# Patient Record
Sex: FEMALE | Race: WHITE | NOT HISPANIC OR LATINO
[De-identification: names, ages, dates, MRNs, and addresses within clinical notes are randomized per-mention and may not be internally consistent; named-entity substitution may affect disease eponyms.]

---

## 2017-05-11 ENCOUNTER — APPOINTMENT (OUTPATIENT)
Dept: ORTHOPEDIC SURGERY | Facility: CLINIC | Age: 67
End: 2017-05-11

## 2017-05-11 DIAGNOSIS — Z78.9 OTHER SPECIFIED HEALTH STATUS: ICD-10-CM

## 2017-05-11 RX ORDER — CHROMIUM 200 MCG
1000 TABLET ORAL
Refills: 0 | Status: ACTIVE | COMMUNITY

## 2017-05-11 RX ORDER — ASPIRIN 81 MG
81 TABLET, DELAYED RELEASE (ENTERIC COATED) ORAL
Refills: 0 | Status: ACTIVE | COMMUNITY

## 2017-07-05 ENCOUNTER — APPOINTMENT (OUTPATIENT)
Dept: ORTHOPEDIC SURGERY | Facility: CLINIC | Age: 67
End: 2017-07-05

## 2017-07-06 ENCOUNTER — APPOINTMENT (OUTPATIENT)
Dept: ORTHOPEDIC SURGERY | Facility: CLINIC | Age: 67
End: 2017-07-06

## 2017-10-06 ENCOUNTER — APPOINTMENT (OUTPATIENT)
Dept: ORTHOPEDIC SURGERY | Facility: CLINIC | Age: 67
End: 2017-10-06
Payer: MEDICARE

## 2017-10-06 PROCEDURE — 99214 OFFICE O/P EST MOD 30 MIN: CPT

## 2017-10-06 PROCEDURE — 73560 X-RAY EXAM OF KNEE 1 OR 2: CPT | Mod: RT

## 2017-10-06 PROCEDURE — 73564 X-RAY EXAM KNEE 4 OR MORE: CPT | Mod: LT

## 2017-10-06 PROCEDURE — 73030 X-RAY EXAM OF SHOULDER: CPT | Mod: RT

## 2018-01-10 ENCOUNTER — APPOINTMENT (OUTPATIENT)
Dept: ORTHOPEDIC SURGERY | Facility: CLINIC | Age: 68
End: 2018-01-10
Payer: MEDICARE

## 2018-01-10 PROCEDURE — 73564 X-RAY EXAM KNEE 4 OR MORE: CPT | Mod: LT

## 2018-01-10 PROCEDURE — 99214 OFFICE O/P EST MOD 30 MIN: CPT | Mod: 25

## 2018-01-10 PROCEDURE — 20610 DRAIN/INJ JOINT/BURSA W/O US: CPT | Mod: LT

## 2018-01-10 RX ORDER — ESOMEPRAZOLE MAGNESIUM 20 MG/1
20 CAPSULE, DELAYED RELEASE ORAL
Qty: 180 | Refills: 0 | Status: ACTIVE | COMMUNITY
Start: 2017-10-26

## 2018-01-10 RX ORDER — PROPRANOLOL HYDROCHLORIDE 40 MG/1
40 TABLET ORAL
Qty: 360 | Refills: 0 | Status: ACTIVE | COMMUNITY
Start: 2017-06-29

## 2018-01-17 ENCOUNTER — OTHER (OUTPATIENT)
Age: 68
End: 2018-01-17

## 2018-05-09 ENCOUNTER — APPOINTMENT (OUTPATIENT)
Dept: ORTHOPEDIC SURGERY | Facility: CLINIC | Age: 68
End: 2018-05-09
Payer: MEDICARE

## 2018-05-09 DIAGNOSIS — M94.262 CHONDROMALACIA, LEFT KNEE: ICD-10-CM

## 2018-05-09 PROCEDURE — 20610 DRAIN/INJ JOINT/BURSA W/O US: CPT | Mod: LT

## 2018-05-09 PROCEDURE — 99213 OFFICE O/P EST LOW 20 MIN: CPT | Mod: 25

## 2018-06-07 ENCOUNTER — APPOINTMENT (OUTPATIENT)
Dept: ORTHOPEDIC SURGERY | Facility: CLINIC | Age: 68
End: 2018-06-07

## 2018-06-07 ENCOUNTER — APPOINTMENT (OUTPATIENT)
Dept: ORTHOPEDIC SURGERY | Facility: CLINIC | Age: 68
End: 2018-06-07
Payer: MEDICARE

## 2018-06-07 PROCEDURE — 99214 OFFICE O/P EST MOD 30 MIN: CPT

## 2018-06-12 ENCOUNTER — APPOINTMENT (OUTPATIENT)
Dept: ORTHOPEDIC SURGERY | Facility: CLINIC | Age: 68
End: 2018-06-12

## 2018-09-04 ENCOUNTER — APPOINTMENT (OUTPATIENT)
Dept: ORTHOPEDIC SURGERY | Facility: CLINIC | Age: 68
End: 2018-09-04
Payer: MEDICARE

## 2018-09-04 PROCEDURE — 99214 OFFICE O/P EST MOD 30 MIN: CPT

## 2018-09-04 PROCEDURE — 73564 X-RAY EXAM KNEE 4 OR MORE: CPT | Mod: LT

## 2018-09-04 RX ORDER — CIPROFLOXACIN HYDROCHLORIDE 500 MG/1
500 TABLET, FILM COATED ORAL
Qty: 14 | Refills: 0 | Status: COMPLETED | COMMUNITY
Start: 2018-06-25

## 2018-09-04 RX ORDER — CELECOXIB 200 MG/1
200 CAPSULE ORAL
Qty: 60 | Refills: 0 | Status: ACTIVE | COMMUNITY
Start: 2018-05-10

## 2018-09-04 RX ORDER — CLINDAMYCIN PHOSPHATE 20 MG/G
2 CREAM VAGINAL
Qty: 40 | Refills: 0 | Status: COMPLETED | COMMUNITY
Start: 2018-05-24

## 2018-09-04 RX ORDER — NITROFURANTOIN (MONOHYDRATE/MACROCRYSTALS) 25; 75 MG/1; MG/1
100 CAPSULE ORAL
Qty: 14 | Refills: 0 | Status: COMPLETED | COMMUNITY
Start: 2018-07-03

## 2018-09-04 NOTE — ASSESSMENT
[FreeTextEntry1] : Riya comes in because she had acute pain today in her knee. It had been doing really well over the summer. It seems like the Synvisc injection may have been helpful.\par She should take some anti-inflammatories for a couple days to quiet down the acute pain. She needs to be careful with her stomach.\par Heat and ice. She's going to get into some physical therapy again for the knee and we reviewed the home exercises. She has a knee brace as well which I would like for her to try using.\par His pain is ongoing we might try another injection either of steroids were in another couple months we could do another round of the hyaluronic acid injections.\par The arthritis doesn't seem to be progressing and ultimately knee replacement may be an option. Hopefully it will settle down since it had been doing rather well.\par She also has had some intermittent shoulder pain. It is doing better now since she can swim for 3 weeks. If the pain starts up again she will go for an MRI given that this has been an ongoing issue. She can also do some physical therapy again for the shoulder and the NSAIDs may be helpful.\par She gets an MRI will call her with the results.

## 2018-09-04 NOTE — HISTORY OF PRESENT ILLNESS
[de-identified] : Riya comes in for followup Acutely for her left knee. This morning she will cup and it was acutely painful and she had difficulty bearing any weight. She was last seen about 3 months ago. We did a corticosteroid injection 4 months ago and the knee was significantly better all summer.\par She just got back from [place]. She had been doing a lot of walking and was okay. She had fallen when she was in [place] and had an abrasion to the knee but it really felt okay after that. Yesterday she went to put him ago gardens and was walking. She will cup this morning with pain. She did go swimming and then after swimming she had worse pain and difficulty walking but the knee had already been hurting. She has not taken anything today. Pain is in the anterior proximal shin just below the knee joint. No swelling or locking but it is stiff. As she was moving around in the office her knee seemed to be getting better than it was when she called up for the appointment\par \par Her Right shoulder has also been hurting. I had seen her for this last October. It was doing better but started to hurt again. She had an appointment this Friday for the shoulder.Her shoulder has been feeling better in the last 3 weeks since she made the appointment because she hasn't been swimming at all. Before that it had been hurting. It usually will hurt posterior lateral and lateral

## 2018-09-04 NOTE — PHYSICAL EXAM
[de-identified] : Left Knee:\par Mildly antalgic gait.\par No effusion.\par - erythema, edema, warmth.\par Healing abrasion over the left anterior knee from a fall last week\par Tender Medial joint line and on the tibia just below the joint line medially. No lateral tenderness.Nontender on the tibial crest.\par ROM:  -4 degrees extension to 115-120 degrees flexion.  Pain at extremes of motion. Minimal crepitus\par - Marina.\par 1A Lachman.  - Pivot shift. - posterior drawer. Normal rotational, varus/valgus laxity.\par Intact extensor mechanism.\par NVI distally.\par \par Right shoulder:\par Cervical spine is without tenderness and ROM is within normal limits and without pain.\par Normal appearance. \par AROM: 175 FE, IR to T 9 equal to the left, 180 abd.\par PROM: 180 FE, 70 ER at the side, 90 ER and 60 IR in the 90 degree abducted position.\par Motor:  5/5  supraspinatus,  5/5 ER, 5/5 IR, 5/5 biceps, 5/5 deltoid.  Normal lift off test\par - Neer, mildly positive Day. -  X-arm.   - Richardson's,  - Speeds.\par Tender over the biceps tendon and the anterior shoulder.  \par Laxity: normal.\par No scapular winging.\par Sensation is intact distally in the UE.\par Skin is intact in the UE. \par Intact Motor distally.\par  [de-identified] : X-rays weightbearing 4 views left knee today show Moderate medial compartment narrowing with spurs. Mild patella spurs. No fractures or bony lesions.

## 2018-09-07 ENCOUNTER — APPOINTMENT (OUTPATIENT)
Dept: ORTHOPEDIC SURGERY | Facility: CLINIC | Age: 68
End: 2018-09-07
Payer: MEDICARE

## 2018-10-29 ENCOUNTER — APPOINTMENT (OUTPATIENT)
Dept: ORTHOPEDIC SURGERY | Facility: CLINIC | Age: 68
End: 2018-10-29
Payer: MEDICARE

## 2018-10-29 VITALS
OXYGEN SATURATION: 98 % | HEART RATE: 71 BPM | WEIGHT: 140 LBS | SYSTOLIC BLOOD PRESSURE: 132 MMHG | HEIGHT: 62 IN | DIASTOLIC BLOOD PRESSURE: 89 MMHG | BODY MASS INDEX: 25.76 KG/M2

## 2018-10-29 DIAGNOSIS — M75.111 INCOMPLETE ROTATOR CUFF TEAR OR RUPTURE OF RIGHT SHOULDER, NOT SPECIFIED AS TRAUMATIC: ICD-10-CM

## 2018-10-29 PROCEDURE — 99214 OFFICE O/P EST MOD 30 MIN: CPT

## 2018-10-29 NOTE — HISTORY OF PRESENT ILLNESS
[de-identified] : Riya comes in for followup for her LEFT knee pain with progressive medial compartment arthritis and meniscus tearing. Her knee is feeling okay. \par It was feeling work when she made the appointment.\par She stopped swimming and walking as much and rested.\par She was going to PT.\par They added back up to 2 miles /day \par She is leaving 4 weeks from this Thursday to go to [place].\par The shoulder is better too. She did therapy. She is not having as much pain since she stopped swimming.\par There is an occasional twinge of pain. The knee may hurt up to 3/10.

## 2018-10-29 NOTE — ASSESSMENT
[FreeTextEntry1] : a 68-year-old with LEFT knee moderate medial compartment degenerative arthritis and meniscus tear and  RIGHT shoulder small partial Articular rotator cuff tear.\par Both are feeling better but she has been much less active than she was before.\par I think she can slowly add inactivity whether it swimming or biking or walking but should stop if it aggravates the conditions.\par It's been almost 6 months since the Synvisc injection and we can do another hyaluronic acid injection after November 9. Depending on how her knee is feeling she will schedule that as needed.\par I emphasized the knee extension because she does have a small flexion contracture and she will continue to do the strengthening exercises. Ibuprofen or Tylenol as needed.\par Followup as needed

## 2018-10-29 NOTE — PHYSICAL EXAM
[UE/LE] : Sensory: Intact in bilateral upper & lower extremities [Normal RUE] : Right Upper Extremity: No scars, rashes, lesions, ulcers, skin intact [Normal LUE] : Left Upper Extremity: No scars, rashes, lesions, ulcers, skin intact [Normal RLE] : Right Lower Extremity: No scars, rashes, lesions, ulcers, skin intact [Normal LLE] : Left Lower Extremity: No scars, rashes, lesions, ulcers, skin intact [Normal Touch] : sensation intact for touch [Normal] : No swelling, no edema, normal pedal pulses and normal temperature [de-identified] : LEFT Knee:\par Nonantalgic gait.\par - effusion.\par - erythema, edema, warmth.\par Tender mildly medial joint line.\par ROM: -5 degrees extension to 130 degrees flexion. - crepitus\par - Marina.\par 1A Lachman.  - Pivot shift. - posterior drawer. Normal rotational, varus/valgus laxity.\par Intact extensor mechanism.\par NVI distally.\par \par RIGHT shoulder:\par Normal appearance. \par AROM: 180 FE, IR to T 9 with mild pain, 180 abd.\par PROM: 180 FE, 70 ER at the side, 90 ER and 50 IR in the 90 degree abducted position.\par Motor:  5/5  supraspinatus,  5/5 ER, 5/5 IR, 5/5 biceps, 5/5 deltoid.  Normal lift off test\par Mildly +Neer and  Day. -  X-arm.   - Urich's, mildly+ Speeds.\par Nontender a.c. joint and nontender over the biceps tendon and the anterior shoulder.  \par Sensation is intact distally in the UE.\par Skin is intact in the UE. \par Intact Motor distally.\par  [de-identified] : \par MRI of RIGHT shoulder September 13, 2018 showed a partial articular supraspinatus tear involving only about 20% thickness, a.c. joint arthrosis, biceps tendinosis and bursitis.

## 2018-10-29 NOTE — REASON FOR VISIT
[Follow-Up Visit] : a follow-up visit for [Knee Pain] : knee pain [Knee Osteoarthritis] : knee osteoarthritis [Shoulder Impingement] : shoulder impingement

## 2018-11-02 ENCOUNTER — APPOINTMENT (OUTPATIENT)
Dept: ORTHOPEDIC SURGERY | Facility: CLINIC | Age: 68
End: 2018-11-02

## 2018-11-07 NOTE — REASON FOR VISIT
[Follow-Up Visit] : a follow-up visit for [Knee Pain] : knee pain [Knee Osteoarthritis] : knee osteoarthritis

## 2018-11-09 ENCOUNTER — APPOINTMENT (OUTPATIENT)
Dept: ORTHOPEDIC SURGERY | Facility: CLINIC | Age: 68
End: 2018-11-09
Payer: MEDICARE

## 2018-11-09 PROCEDURE — 99211 OFF/OP EST MAY X REQ PHY/QHP: CPT | Mod: 25

## 2018-11-09 PROCEDURE — 20610 DRAIN/INJ JOINT/BURSA W/O US: CPT | Mod: LT

## 2018-11-09 NOTE — HISTORY OF PRESENT ILLNESS
[de-identified] : Riya comes in for her left knee pain/ OA. Her knee is not has terrible as it has been but she has been doing less activity did not aggravate it. She decided to try the hyaluronic acid injection.\par

## 2018-11-09 NOTE — PHYSICAL EXAM
[de-identified] : Left Knee:\par Nonantalgic gait.\par 1+  effusion.\par - erythema, edema, warmth.\par Tender medial joint line.\par ROM: -4 degrees extension to 125 degrees flexion. crepitus\par - Marina.\par 1A Lachman.  - Pivot shift. - posterior drawer. Normal rotational, varus/valgus laxity.\par Intact extensor mechanism.\par NVI distally.\par

## 2018-11-09 NOTE — PROCEDURE
[Aspiration] : Aspiration [Injection] : Injection [Knee Joint] : knee joint [Osteoarthritis] : Osteoarthritis [Joint Pain] : Joint pain [Patient] : patient [Risk] : risk [Benefits] : benefits [Alternatives] : alternatives [Bleeding] : bleeding [Infection] : infection [Allergic Reaction] : allergic reaction [Verbal Consent Obtained] : verbal consent was obtained prior to the procedure [Alcohol] : Alcohol [Betadine] : Betadine [Ethyl Chloride Spray] : ethyl chloride spray was used as a topical anesthetic [___ mL Fluid] : [unfilled] mL of [Yellow] : yellow [Clear] : clear [Same Needle/New Syringe] : the syringe was changed and the same needle was left in place and [Bandage Applied] : a bandage [Tolerated Well] : The patient tolerated the procedure well [None] : none [No Strenuous Activity___day(s)] : avoid strenuous activity for [unfilled] day(s) [PRN] : as needed [FreeTextEntry8] : Monovisc 88mg/4ml, [id number], 6/30/20

## 2018-11-09 NOTE — ASSESSMENT
[FreeTextEntry1] : 68-year-old with progressive LEFT knee osteoarthritis medial compartment. I think that it entered meniscus tears really not the issue at this point and it's really the arthritis. Today we did another hyaluronic acid injection. I did a different brand which has less risk of allergic reaction than the Synvisc which he had last time.\par Hopefully will give her good relief. She should continue doing her exercises. She'll take it easy a few days after the injection and can ice and take the Celebrex if needed.\par She will start swimming next month and hopefully won't aggravate her shoulder which is feeling better.\par Followup as needed

## 2019-07-09 ENCOUNTER — APPOINTMENT (OUTPATIENT)
Dept: ORTHOPEDIC SURGERY | Facility: CLINIC | Age: 69
End: 2019-07-09
Payer: MEDICARE

## 2019-07-09 PROCEDURE — 99214 OFFICE O/P EST MOD 30 MIN: CPT

## 2019-07-09 NOTE — PHYSICAL EXAM
[LE] : Sensory: Intact in bilateral lower extremities [DP] : dorsalis pedis 2+ and symmetric bilaterally [PT] : posterior tibial 2+ and symmetric bilaterally [Normal RUE] : Right Upper Extremity: No scars, rashes, lesions, ulcers, skin intact [Normal LUE] : Left Upper Extremity: No scars, rashes, lesions, ulcers, skin intact [Normal RLE] : Right Lower Extremity: No scars, rashes, lesions, ulcers, skin intact [Normal LLE] : Left Lower Extremity: No scars, rashes, lesions, ulcers, skin intact [Normal Touch] : sensation intact for touch [de-identified] : Knees:\par Nonantalgic gait.\par No effusion.\par - erythema, edema, warmth.\par No significant point tenderness anywhere bilateral knees..\par ROM: Mild flexion contracture RIGHT knee of about 5° with flexion to 125°. LEFT knee is 0-130°. No significant crepitus\par - Marina.\par 1A Lachman. Normal rotational, varus/valgus laxity.\par Intact extensor mechanism.\par NVI distally.\par \par RIGHT  shoulder:\par Cervical spine is without tenderness and ROM is within normal limits and without pain.\par Normal appearance. \par AROM: 175 FE, IR to T 9 versus T8, 180 abd.\par PROM: 175 FE, 50 ER at the side, 90 ER and 15  IR RIGHT versus 45 on the LEFT in the 90 degree abducted position.\par Motor:  5/5  supraspinatus,  5/5 ER, 5/5 IR, 5/5 biceps, 5/5 deltoid.  Normal lift off test\par mildly positive Neer and Dya RIGHT shoulder. -  X-arm.   - Fairview's. Mildly positive Speeds RIGHT shoulder.\par Nontender a.c. joint and nontender over the biceps tendon and the anterior shoulder.  \par No scapular winging.\par Sensation is intact distally in the UE.\par Skin is intact in the UE. \par Intact Motor distally.\par

## 2019-07-09 NOTE — ASSESSMENT
[FreeTextEntry1] : A 69-year-old with LEFT knee moderate osteoarthritis which seems to be doing significantly better now than it was a year ago. She still has some pain but she has cut back a bit on her exercise and strengthening then lost a little bit of weight and perhaps a hyaluronic acid injections helped somewhat. Given the fact that her knee is doing well neither press felt it was necessary to do another injection at this time but if the pain gets worse then certainly we can do another hyaluronic acid injection. If the pain gets worse we will also get new x-rays to see if the arthritis is worsening.\par Tylenol as needed for pain. Try to maintain motion and strength.\par Her shoulder is doing well with mild stiffness but good strength. She is swimming. I suggested trying things when she swims to see if this take stress off the shoulder at times. Maintain rotator cuff and periscapular strengthening.\par Followup as needed

## 2019-07-09 NOTE — HISTORY OF PRESENT ILLNESS
[de-identified] : Riya comes in for f/u for her left knee. \par She was last seen in November 2018 and we did a hyaluronic acid injection, Monovisc, which she found helpful.I took a while to take and that her knee then did seem to get better for an extended period of time. It hasn't been swelling.\par She last had x-rays in September 2018 a little over 10 months ago which showed moderate medial compartment narrowing/osteoarthritis.\par Her knee is doing fare. 1 mo ago with a lot of clicking.It was more painful as well and she called to make the appointment. In the meantime since then her knee has improved and it is not as bad.\par She takes no medication for pain. She cannot take any NSAIDs.\par She stopped swimming for awhile. She is swimming 1/2 hr\par She is walking , [id number] steps / day.\par Her RIGHT shoulder hurts a variable amount but is better than it used to be. She is able to swim. If she sleeps on it then it will hurt when she wakes up.\par She did lose 10 pounds.

## 2019-09-06 ENCOUNTER — APPOINTMENT (OUTPATIENT)
Dept: ORTHOPEDIC SURGERY | Facility: CLINIC | Age: 69
End: 2019-09-06
Payer: MEDICARE

## 2019-09-06 DIAGNOSIS — M25.462 EFFUSION, LEFT KNEE: ICD-10-CM

## 2019-09-06 PROCEDURE — 20611 DRAIN/INJ JOINT/BURSA W/US: CPT | Mod: LT

## 2019-09-06 PROCEDURE — 73564 X-RAY EXAM KNEE 4 OR MORE: CPT | Mod: 50

## 2019-09-06 PROCEDURE — 99213 OFFICE O/P EST LOW 20 MIN: CPT | Mod: 25

## 2019-09-06 NOTE — HISTORY OF PRESENT ILLNESS
[de-identified] : Left knee is hurting more again in the past week. It hurts as she steps. Today it feels better again.\par Last HA injection was in Nov 2018.\par She cannot take NSAIDs . She can take Tylenol.\par Pain is aggravated by Walking and she was getting sharp pains in the front of her knee. No swelling or locking or buckling.

## 2019-09-06 NOTE — DISCUSSION/SUMMARY
[de-identified] : 70 yo with left knee OA Moderate to severe focally in the medial compartment. There is no significant RIGHT knee arthritis but likely there is some areas of mild wear and tear/chondromalacia.\par She is doing better now than she had been a year or 2 ago. She does get intermittent pain. Today is a good day but she was having a lot more pain recently. We discussed trying the hyaluronic acid injections again and decided to do it LEFT knee only. After the injection she had some stiffness and soreness which should resolve in the next day with some ice and rest and Tylenol.\par \par Continue exercises to keep muscles strong and keep weight down.\par Heat and ice as needed.\par Tylenol prn.,\par \par F/U prn

## 2019-09-06 NOTE — REASON FOR VISIT
[Follow-Up Visit] : a follow-up visit for [Knee Osteoarthritis] : knee osteoarthritis [Knee Pain] : knee pain

## 2019-09-06 NOTE — PROCEDURE
[Injection] : Injection [Left] : of the left [Knee Joint] : knee joint [Osteoarthritis] : Osteoarthritis [Patient] : patient [Risk] : risk [Benefits] : benefits [Alternatives] : alternatives [Bleeding] : bleeding [Infection] : infection [Allergic Reaction] : allergic reaction [Verbal Consent Obtained] : verbal consent was obtained prior to the procedure [Alcohol] : Alcohol [Ethyl Chloride Spray] : ethyl chloride spray was used as a topical anesthetic [Betadine] : Betadine [Lateral] : lateral [Same Needle/New Syringe] : the syringe was changed and the same needle was left in place and [Bandage Applied] : a bandage [None] : none [No Strenuous Activity___day(s)] : avoid strenuous activity for [unfilled] day(s) [PRN] : as needed [FreeTextEntry8] : Monovisc 88mg/4ml, lot [id number] , exp 11/30/21 [de-identified] : soreness in the knee after

## 2019-09-06 NOTE — PHYSICAL EXAM
[Knee Motion Right] : full ROM [Knee Swelling Left] : swelling [Knee Tenderness On Palpation Left] : tenderness [Knee Motion Left Crepitus] : crepitus with ROM [Normal] : Gait: normal [LE] : Sensory: Intact in bilateral lower extremities [Normal RLE] : Right Lower Extremity: No scars, rashes, lesions, ulcers, skin intact [Normal LLE] : Left Lower Extremity: No scars, rashes, lesions, ulcers, skin intact [Normal Touch] : sensation intact for touch [Knee Swelling Right] : no swelling [Knee Tenderness On Palpation Right] : no tenderness [Knee Motion Right Crepitus] : no crepitus with ROM [Knee Stability / Maneuvers] : negative patellofemoral apprehension test [Knee Anterior Drawer Sign Right] : negative anterior drawer sign [Knee Posterior Drawer Sign Right] : negative posterior drawer sign [Knee Meniscal Integ Julio César's Displace Test Right Medial] : negative Julio César's medially [Knee Meniscal Integ Julio César's Displace Test Right Lateral] : negative Julio César's laterally [Knee Instability Laxity Right Anterior Cruciate Ligament] : negative pivot shift test [Knee Medial Instability Right] : no laxity on valgus stress [Knee Lateral Instability Right] : no laxity on varus stress [Knee Motion Left] : limited ROM [Patellofemoral Apprehension Test Left] : negative patellofemoral apprehension test [Knee Anterior Drawer Sign Left] : negative anterior drawer sign [Knee Posterior Drawer Sign Left] : negative posterior drawer sign [Knee Tender On Palp With Quadriceps Contracted (Shrug Sign)] : negative patellar grind [Knee Instability Laxity Left Anterior Cruciate Ligament] : negative pivot shift test [Knee Medial Instability Left] : no laxity on valgus stress [Knee Lateral Instability Left] : no  laxity on varus stress [de-identified] : Knees\par No effusion. No erythema.\par Range of motion is 0-125° on the LEFT versus 130-135 on the RIGHT with pain on the LEFT on full flexion mildly.\par Tender mildly medial joint line LEFT knee.\par These are both stable to exam.\par Normal neurovascular exam. [de-identified] : \par x-rays left knee WB 4 views today show Medial compartment narrowing Moderately severe with small osteophytes and sclerosis. Other compartments are relatively well maintained as is the RIGHT knee x-rays weightbearing for views without significant arthritis

## 2020-03-11 PROCEDURE — 88307 TISSUE EXAM BY PATHOLOGIST: CPT | Mod: 26

## 2020-03-17 ENCOUNTER — OUTPATIENT (OUTPATIENT)
Dept: OUTPATIENT SERVICES | Facility: HOSPITAL | Age: 70
LOS: 1 days | End: 2020-03-17
Payer: MEDICARE

## 2020-03-17 DIAGNOSIS — D23.4 OTHER BENIGN NEOPLASM OF SKIN OF SCALP AND NECK: ICD-10-CM

## 2020-03-17 PROCEDURE — 88307 TISSUE EXAM BY PATHOLOGIST: CPT

## 2020-03-18 LAB — SURGICAL PATHOLOGY STUDY: SIGNIFICANT CHANGE UP

## 2020-09-22 PROBLEM — M25.562 LEFT KNEE PAIN, UNSPECIFIED CHRONICITY: Status: ACTIVE | Noted: 2017-05-11

## 2020-09-23 ENCOUNTER — APPOINTMENT (OUTPATIENT)
Dept: ORTHOPEDIC SURGERY | Facility: CLINIC | Age: 70
End: 2020-09-23
Payer: MEDICARE

## 2020-09-23 DIAGNOSIS — M25.562 PAIN IN LEFT KNEE: ICD-10-CM

## 2020-09-23 PROCEDURE — 20610 DRAIN/INJ JOINT/BURSA W/O US: CPT | Mod: LT

## 2020-09-23 PROCEDURE — 99213 OFFICE O/P EST LOW 20 MIN: CPT | Mod: 25

## 2020-09-23 PROCEDURE — 73564 X-RAY EXAM KNEE 4 OR MORE: CPT | Mod: 50

## 2020-09-23 NOTE — HISTORY OF PRESENT ILLNESS
[de-identified] : Riya was last seen 1 yr ago. Monovisc injection was done which helped. \par Left knee is hurting gradually more. It hurts as she steps. She can't bend her knee as well. She hasn't had the really severe pain she had in the past. \par The knee swells some. Pain is about 5/10 and occasionally worse. She can walk 3-5 miles, 3-4 days / wk. \par The right knee is starting to hurt. \par She cannot take NSAIDs . She can take Tylenol and sometimes uses Voltaren gel.\par Pain is aggravated by Walking and she was getting sharp pains in the front of her knee. No swelling or locking or buckling.
(4) no impairment

## 2020-09-23 NOTE — PROCEDURE
[Aspiration] : Aspiration [Injection] : Injection [Left] : of the left [Knee Joint] : knee joint [Effusion] : Effusion [Osteoarthritis] : Osteoarthritis [Patient] : patient [Risk] : risk [Benefits] : benefits [Alternatives] : alternatives [Bleeding] : bleeding [Infection] : infection [Allergic Reaction] : allergic reaction [Verbal Consent Obtained] : verbal consent was obtained prior to the procedure [Alcohol] : Alcohol [Betadine] : Betadine [Ethyl Chloride Spray] : ethyl chloride spray was used as a topical anesthetic [Lateral] : lateral [20] : a 20-gauge [Yellow] : yellow [Clear] : clear [Same Needle/New Syringe] : the syringe was changed and the same needle was left in place and [Bandage Applied] : a bandage [Tolerated Well] : The patient tolerated the procedure well [None] : none [No Strenuous Activity___day(s)] : avoid strenuous activity for [unfilled] day(s) [PRN] : as needed [___ mL Fluid] : [unfilled] mL of [FreeTextEntry8] : Monovisc, 4 ml, lot [id number], exp 9/30/22

## 2020-09-23 NOTE — ASSESSMENT
[FreeTextEntry1] : A 70 year-old with LEFT knee moderate osteoarthritis and minimal RIGHT knee arthritis.\par We did hyaluronic acid injection today which was very helpful it seems year ago. The arthritis doesn't seem to be progressing significantly by x-ray that her symptoms are a little bit worse.\par She should get back to doing her knee exercises with strengthening around the knees. Gentle stretching. Heat and ice as needed.\par Tylenol and or Voltaren gel as needed for pain. Followup as needed

## 2020-09-23 NOTE — PHYSICAL EXAM
[LE] : Sensory: Intact in bilateral lower extremities [Knee Motion Right] : full ROM [Knee Swelling Left] : swelling [Knee Tenderness On Palpation Left] : tenderness [Knee Motion Left Crepitus] : crepitus with ROM [Normal RLE] : Right Lower Extremity: No scars, rashes, lesions, ulcers, skin intact [Normal LLE] : Left Lower Extremity: No scars, rashes, lesions, ulcers, skin intact [Normal Touch] : sensation intact for touch [DP] : dorsalis pedis 2+ and symmetric bilaterally [PT] : posterior tibial 2+ and symmetric bilaterally [Knee Swelling Right] : no swelling [Knee Tenderness On Palpation Right] : no tenderness [Knee Motion Right Crepitus] : no crepitus with ROM [Knee Stability / Maneuvers] : negative patellofemoral apprehension test [Knee Anterior Drawer Sign Right] : negative anterior drawer sign [Knee Posterior Drawer Sign Right] : negative posterior drawer sign [Knee Meniscal Integ Julio César's Displace Test Right Medial] : negative Julio César's medially [Knee Meniscal Integ Julio César's Displace Test Right Lateral] : negative Julio César's laterally [Knee Instability Laxity Right Anterior Cruciate Ligament] : negative pivot shift test [Knee Medial Instability Right] : no laxity on valgus stress [Knee Lateral Instability Right] : no laxity on varus stress [Knee Motion Left] : limited ROM [Patellofemoral Apprehension Test Left] : negative patellofemoral apprehension test [Knee Anterior Drawer Sign Left] : negative anterior drawer sign [Knee Posterior Drawer Sign Left] : negative posterior drawer sign [Knee Tender On Palp With Quadriceps Contracted (Shrug Sign)] : negative patellar grind [Knee Instability Laxity Left Anterior Cruciate Ligament] : negative pivot shift test [Knee Medial Instability Left] : no laxity on valgus stress [Knee Lateral Instability Left] : no  laxity on varus stress [Normal] : Oriented to person, place, and time, insight and judgement were intact and the affect was normal [de-identified] : Knees\par nonantalgic gait.\par Trace LEFT knee effusion. No erythema.\par Range of motion is 0-135° on the RIGHT versus 5° to 125°with pain on the LEFT on full flexion mildly.\par Tender mildly medial joint line LEFT knee. Minimally tender RIGHT knee\par Normal varus and valgus and AP stability..\par Normal neurovascular exam. [de-identified] : no respiratory distress or cough [de-identified] : \par x-rays bilateral knees WB 4 views today show Medial compartment narrowing Moderate LEFT and minimal RIGHT with small osteophytes and sclerosis. Other compartments are relatively well maintained as is the RIGHT knee x-rays weightbearing for views without significant arthritis

## 2020-10-19 ENCOUNTER — APPOINTMENT (OUTPATIENT)
Dept: ORTHOPEDIC SURGERY | Facility: CLINIC | Age: 70
End: 2020-10-19
Payer: MEDICARE

## 2020-10-19 PROCEDURE — 99214 OFFICE O/P EST MOD 30 MIN: CPT | Mod: 25

## 2020-10-19 PROCEDURE — 20610 DRAIN/INJ JOINT/BURSA W/O US: CPT | Mod: RT

## 2020-10-19 NOTE — ADDENDUM
[FreeTextEntry1] : Patient when immediately for an MRI scan of her RIGHT knee which showed degenerative complex tear posterior horn of the medial meniscus with a partial radial compound and and it appears to partly extend towards the meniscus root and there is some mild bone marrow edema in this area. There is some chondral wear central medial femoral condyle and the distal quadriceps tendinosis and a large joint effusion and synovitis.I recommended trying corticosteroid injection they'll keep her on crutches to keep pressure off the knee and use a knee brace. A medial  brace may be helpful to keep the stress off as well as healing. I prescribed a brace as well.\par ice and elevate. Tylenol, Voltaren gel or Celebrex as needed. She should do nonweightbearing exercises/straight leg raises to strengthen around her knee. Followup in about 2 weeks.

## 2020-10-19 NOTE — PROCEDURE
[Aspiration] : Aspiration [Injection] : Injection [Right] : of the right [Knee Joint] : knee joint [Joint Pain] : Joint pain [Patient] : patient [Risk] : risk [Benefits] : benefits [Bleeding] : bleeding [Alternatives] : alternatives [Infection] : infection [Allergic Reaction] : allergic reaction [Alcohol] : Alcohol [Verbal Consent Obtained] : verbal consent was obtained prior to the procedure [Betadine] : Betadine [Ethyl Chloride Spray] : ethyl chloride spray was used as a topical anesthetic [Lateral] : lateral [20] : a 20-gauge [Same Needle/New Syringe] : the syringe was changed and the same needle was left in place and [1% Lidocaine___(mL)] : [unfilled] mL of 1% Lidocaine [Methylpred. 40mg/mL___(mL)] : [unfilled] mL of 40mg/mL methylprednisolone [Bandage Applied] : a bandage [Tolerated Well] : The patient tolerated the procedure well [None] : none [No Strenuous Activity___day(s)] : avoid strenuous activity for [unfilled] day(s) [___ Week(s)] : in [unfilled] week(s) [___ mL Fluid] : [unfilled] mL of [de-identified] : serosanguinous [de-identified] : she could almost fully extend her knee after the injection and could flex further to about 90° with much less pain and put partial weight on her foot with less but not complete pain relief.

## 2020-10-19 NOTE — ASSESSMENT
[FreeTextEntry1] : 70-year-old woman with severe RIGHT knee pain. She has moderate LEFT knee medial compartment arthritis but just very minimal degenerative changes in her RIGHT knee based on x-ray a few weeks ago.\par She was referred for an MRI scan.\par Differential diagnosis includes meniscus tear versus insufficiency fracture I will call her with the results. If there is a degenerative meniscus tear we may try a corticosteroid injection first before considering arthroscopic surgery.\par if there is a large displaced flap then surgery may be considered first.\par If there is an insufficiency fracture than protected weightbearing.\par Continue with Tylenol elevate and ice and crutches.\par In her LEFT knee she has moderate arthritis which is now getting aggravated by favoring her RIGHT knee.\par \par

## 2020-10-19 NOTE — HISTORY OF PRESENT ILLNESS
[de-identified] : Ms. Yanez called over the weekend because she Had acute pain in her RIGHT knee. Normally ask her LEFT knee that gives her trouble. She first had pain in his knee last week and it would get better and worse depending on activity. It became quite severe with loss of flexion and extension and swelling. She had to get crutches to walk. There was no specific injury but she had been doing some gardening and had been walking regularly and a little further before this started.\par She took Tylenol and took some Celebrex over the weekend. Pain is severe, 9/10 worse with weightbearing and somewhat better with rest and sitting\par Last month when I saw her she was having some mild RIGHT knee pain at that time and we did do x-rays showing some mild arthritis.\par The Monovisc Injection in her LEFT knee about 3-4 weeks ago has not helped at all.

## 2020-10-19 NOTE — PHYSICAL EXAM
[Knee Swelling Right] : no swelling [Knee Motion Right Crepitus] : no crepitus with ROM [Knee Stability / Maneuvers] : negative patellofemoral apprehension test [Knee Tenderness On Palpation Right] : no tenderness [Knee Anterior Drawer Sign Right] : negative anterior drawer sign [Knee Posterior Drawer Sign Right] : negative posterior drawer sign [Knee Meniscal Integ Julio César's Displace Test Right Lateral] : negative Julio César's laterally [Knee Meniscal Integ Julio César's Displace Test Right Medial] : negative Julio César's medially [Knee Instability Laxity Right Anterior Cruciate Ligament] : negative pivot shift test [Knee Medial Instability Right] : no laxity on valgus stress [Knee Motion Left] : limited ROM [Knee Lateral Instability Right] : no laxity on varus stress [Knee Posterior Drawer Sign Left] : negative posterior drawer sign [Knee Anterior Drawer Sign Left] : negative anterior drawer sign [Patellofemoral Apprehension Test Left] : negative patellofemoral apprehension test [Knee Tender On Palp With Quadriceps Contracted (Shrug Sign)] : negative patellar grind [Knee Instability Laxity Left Anterior Cruciate Ligament] : negative pivot shift test [Knee Medial Instability Left] : no laxity on valgus stress [Knee Lateral Instability Left] : no  laxity on varus stress [de-identified] : Knees\par antalgic gait. She cannot put full weight on her RIGHT leg\par 1+ RIGHT knee effusion without significant warmth. No erythema or ecchymoses.\par Trace LEFT knee effusion. No erythema.\par Range of motion is -15-65° on the RIGHT versus 5° to 125°with pain on the LEFT on full flexion mildly.\par Tender mildly medial joint line LEFT knee\par No significant point tenderness RIGHT knee. She feels pain laterally but there is no tenderness lateral joint line or lateral femoral condyle\par + Marina right knee\par Normal varus and valgus and AP stability..\par Normal neurovascular exam. [de-identified] : no respiratory distress or cough [de-identified] : \par x-rays bilateral knees WB 4 views Sept 2020 showed Medial compartment narrowing Moderate LEFT and minimal RIGHT with small osteophytes and sclerosis. Other compartments are relatively well maintained as is the RIGHT knee x-rays weightbearing for views without significant arthritis

## 2020-11-02 ENCOUNTER — APPOINTMENT (OUTPATIENT)
Dept: ORTHOPEDIC SURGERY | Facility: CLINIC | Age: 70
End: 2020-11-02
Payer: MEDICARE

## 2020-11-02 PROCEDURE — 99214 OFFICE O/P EST MOD 30 MIN: CPT

## 2020-11-02 NOTE — ASSESSMENT
[FreeTextEntry1] : 70-year-old woman with acute RIGHT knee pain. She has moderate LEFT knee medial compartment arthritis but only mild degenerative changes in her RIGHT knee based on x-ray a few weeks ago. \par She was referred for an MRI scan.\par MRI showed a horizontal and small radial tear posterior horn medial meniscus without any significant displaced flaps that are seen. There may be a partial tear extending into the meniscal root without any obvious subluxation of the meniscus or extrusion.\par There is mild chondral wear medial and patellofemoral compartments.\par Her knee is much better today than it was a couple weeks ago.With a tear like this that is more degenerative associated with mild arthritis I would recommend trying some conservative treatment before and even thinking about doing a knee arthroscopy in surgery. These tears can get better without surgery. She now has much better motion and is able to walk on the leg which she couldn't do a couple weeks ago.\par She can start physical therapy next week but continue doing the leg lifts on her own for both knees. She can walk partial to full weightbearing with the crutches and using the knee braces as needed. If her knee is feeling a lot better she could use a cane.\par voltaren gel and Celebrex as needed. Ice has needed.\par Followup in 3-4 weeks

## 2020-11-02 NOTE — HISTORY OF PRESENT ILLNESS
[de-identified] : Ms. Yanez comes in for f/u for the  acute pain in her RIGHT knee. \par She felt good for 5-6 days after the injection but then twisted the knee slightly and pain got worse again last Sunday. It is feeling progressively better again and less painful. She's been using the crutches partial weightbearing for the RIGHT knee and also to try to not aggravate the arthritis on the LEFT.\par She has the  brace that she has for both her for her knees.\par Pain right now is worse on the LEFT than her RIGHT knee. She is understandably frustrated and would like to feel better so she can get back to walking. When I last saw her she was doing a lot of walking and her LEFT knee was not chronically painful to the point that she would think about knee replacement.\par She is taking some intermittent Celebrex.

## 2020-11-02 NOTE — PHYSICAL EXAM
[Crutches] : ambulates with crutches [LE] : Sensory: Intact in bilateral lower extremities [DP] : dorsalis pedis 2+ and symmetric bilaterally [PT] : posterior tibial 2+ and symmetric bilaterally [Knee Motion Right] : full ROM [Knee Swelling Left] : swelling [Knee Tenderness On Palpation Left] : tenderness [Knee Motion Left Crepitus] : crepitus with ROM [Normal RLE] : Right Lower Extremity: No scars, rashes, lesions, ulcers, skin intact [Normal LLE] : Left Lower Extremity: No scars, rashes, lesions, ulcers, skin intact [Normal Touch] : sensation intact for touch [Normal] : Oriented to person, place, and time, insight and judgement were intact and the affect was normal [Slightly Antalgic] : slightly antalgic [Knee Swelling Right] : no swelling [Knee Tenderness On Palpation Right] : no tenderness [Knee Motion Right Crepitus] : no crepitus with ROM [Knee Stability / Maneuvers] : negative patellofemoral apprehension test [Knee Anterior Drawer Sign Right] : negative anterior drawer sign [Knee Posterior Drawer Sign Right] : negative posterior drawer sign [Knee Meniscal Integ Julio César's Displace Test Right Medial] : negative Julio César's medially [Knee Meniscal Integ Julio César's Displace Test Right Lateral] : negative Julio César's laterally [Knee Instability Laxity Right Anterior Cruciate Ligament] : negative pivot shift test [Knee Medial Instability Right] : no laxity on valgus stress [Knee Lateral Instability Right] : no laxity on varus stress [Knee Motion Left] : limited ROM [Patellofemoral Apprehension Test Left] : negative patellofemoral apprehension test [Knee Anterior Drawer Sign Left] : negative anterior drawer sign [Knee Posterior Drawer Sign Left] : negative posterior drawer sign [Knee Tender On Palp With Quadriceps Contracted (Shrug Sign)] : negative patellar grind [Knee Instability Laxity Left Anterior Cruciate Ligament] : negative pivot shift test [Knee Medial Instability Left] : no laxity on valgus stress [Knee Lateral Instability Left] : no  laxity on varus stress [de-identified] : Knees\par She can walk without any brace or crutches short distance with mild LEFT greater than RIGHT knee pain. She is walking somewhat cautiously.. \par 1+ RIGHT knee effusion without significant warmth. No erythema or ecchymoses.\par Trace LEFT knee effusion. No erythema.\par Range of motion is 0 -125° on the RIGHT versus 5° to 125°with pain on the LEFT. more pain LEFT and RIGHT knee on full flexion\par Tender mildly medial joint line LEFT knee. Tender RIGHT knee medial joint line mildly.\par With gentle Marina she did not have pain. I did not want to aggravate her knee so did not twist it with any force\par Normal varus and valgus and AP stability..\par Normal neurovascular exam. [de-identified] : no respiratory distress or cough [de-identified] : \par x-rays bilateral knees WB 4 views Sept 2020 showed Medial compartment narrowing Moderate LEFT and minimal RIGHT with small osteophytes and sclerosis. Other compartments are relatively well maintained as is the RIGHT knee x-rays weightbearing for views without significant arthritis \par \par MRI right knee on 10/19/20 showed a complex tear near meniscal root and posterior horn and mild cartilage wear PF and medial compartments.

## 2020-11-11 ENCOUNTER — RESULT REVIEW (OUTPATIENT)
Age: 70
End: 2020-11-11

## 2020-11-11 PROCEDURE — 88305 TISSUE EXAM BY PATHOLOGIST: CPT | Mod: 26

## 2020-11-12 DIAGNOSIS — M79.661 PAIN IN RIGHT LOWER LEG: ICD-10-CM

## 2020-11-13 ENCOUNTER — OUTPATIENT (OUTPATIENT)
Dept: OUTPATIENT SERVICES | Facility: HOSPITAL | Age: 70
LOS: 1 days | End: 2020-11-13
Payer: MEDICARE

## 2020-11-13 DIAGNOSIS — L72.3 SEBACEOUS CYST: ICD-10-CM

## 2020-11-13 PROCEDURE — 88305 TISSUE EXAM BY PATHOLOGIST: CPT

## 2020-11-17 LAB — SURGICAL PATHOLOGY STUDY: SIGNIFICANT CHANGE UP

## 2020-11-24 ENCOUNTER — APPOINTMENT (OUTPATIENT)
Dept: ORTHOPEDIC SURGERY | Facility: CLINIC | Age: 70
End: 2020-11-24

## 2020-12-02 ENCOUNTER — APPOINTMENT (OUTPATIENT)
Dept: ORTHOPEDIC SURGERY | Facility: CLINIC | Age: 70
End: 2020-12-02
Payer: MEDICARE

## 2020-12-02 PROCEDURE — 99214 OFFICE O/P EST MOD 30 MIN: CPT

## 2020-12-02 NOTE — HISTORY OF PRESENT ILLNESS
[de-identified] : Ms. Yanez comes in for f/u for the  acute pain in her RIGHT knee with a posterior horn MMT. She injured it about 6 weeks ago. Her pain has been variable. She's not having any of the acute, severe pain like she had in the beginning. It seemed a lot better but then seemed to be getting worse. She can walk further distances. She is doing physical therapy.She is frustrated that she can't swim or do her normal exercises in part because of the Covid pandemic.\par She does get a little swelling.She did take some Celebrex.\par She was having leg pain/calf pain and was sent for Doppler US which was negative for DVT. I think it was likely the brace that was causing pressure on the nerve/probably the peroneal nerve.She stopped wearing the brace and the pain gradually went away.\par She has a 2-week-old grandchild and she is going to try to see in about 2 weeks she is going to be quarantining before that.

## 2020-12-02 NOTE — ASSESSMENT
[FreeTextEntry1] : 70-year-old woman with acute RIGHT knee pain. She has moderate LEFT knee medial compartment arthritis but only mild degenerative changes in her RIGHT knee. She had an acute RIGHT knee medial meniscus tear about 6 weeks ago. Her knee is improving but not quite enough as we would like. I recommended that she walk with a cane as needed. She could try the brace but should never leave it on when she is sitting or leave it on for long periods of time. Otherwise she can just wear her soft brace but nothing tight around her leg. He didn't ice as needed. Celebrex as needed. Physical therapy and home exercises.\par If it's still not getting better we could consider trying hyaluronic acid injection versus arthroscopic surgery. \par

## 2020-12-02 NOTE — PHYSICAL EXAM
[Knee Swelling Right] : no swelling [Knee Tenderness On Palpation Right] : no tenderness [Knee Motion Right Crepitus] : no crepitus with ROM [Knee Stability / Maneuvers] : negative patellofemoral apprehension test [Knee Anterior Drawer Sign Right] : negative anterior drawer sign [Knee Posterior Drawer Sign Right] : negative posterior drawer sign [Knee Meniscal Integ Julio César's Displace Test Right Medial] : negative Julio César's medially [Knee Meniscal Integ Julio César's Displace Test Right Lateral] : negative Julio César's laterally [Knee Instability Laxity Right Anterior Cruciate Ligament] : negative pivot shift test [Knee Medial Instability Right] : no laxity on valgus stress [Knee Lateral Instability Right] : no laxity on varus stress [Knee Motion Left] : limited ROM [Patellofemoral Apprehension Test Left] : negative patellofemoral apprehension test [Knee Anterior Drawer Sign Left] : negative anterior drawer sign [Knee Posterior Drawer Sign Left] : negative posterior drawer sign [Knee Tender On Palp With Quadriceps Contracted (Shrug Sign)] : negative patellar grind [Knee Instability Laxity Left Anterior Cruciate Ligament] : negative pivot shift test [Knee Medial Instability Left] : no laxity on valgus stress [Knee Lateral Instability Left] : no  laxity on varus stress [de-identified] : Knees\par Lynn she was walking with a limp keeping her RIGHT knee flexed but I encouraged her to try to walk more up RIGHT and she could do so but had some mild 2/10 pain in the RIGHT knee but no limp.. \par Trace + RIGHT knee effusion without significant warmth. No erythema or ecchymoses.\par Trace LEFT knee effusion. No erythema.\par Range of motion is 0 -125° on the RIGHT versus 5° to 125°with pain on the LEFT.  mild pain RIGHT knee on full flexion\par Tender mildly medial joint line LEFT knee. Tender RIGHT knee medial joint line mildly.\par With gentle Marina she did not have pain.\par Normal varus and valgus and AP stability..\par Normal neurovascular exam. [de-identified] : no respiratory distress or cough [de-identified] : \par x-rays bilateral knees WB 4 views Sept 2020 showed Medial compartment narrowing Moderate LEFT and minimal RIGHT with small osteophytes and sclerosis. Other compartments are relatively well maintained as is the RIGHT knee x-rays weightbearing for views without significant arthritis \par \par MRI right knee on 10/19/20 showed a complex tear near meniscal root and posterior horn and mild cartilage wear PF and medial compartments.

## 2021-03-23 PROBLEM — M67.911 TENDINOPATHY OF ROTATOR CUFF, RIGHT: Status: ACTIVE | Noted: 2017-10-06

## 2021-03-26 ENCOUNTER — APPOINTMENT (OUTPATIENT)
Dept: ORTHOPEDIC SURGERY | Facility: CLINIC | Age: 71
End: 2021-03-26
Payer: MEDICARE

## 2021-03-26 DIAGNOSIS — M25.461 EFFUSION, RIGHT KNEE: ICD-10-CM

## 2021-03-26 DIAGNOSIS — M67.911 UNSPECIFIED DISORDER OF SYNOVIUM AND TENDON, RIGHT SHOULDER: ICD-10-CM

## 2021-03-26 DIAGNOSIS — M19.011 PRIMARY OSTEOARTHRITIS, RIGHT SHOULDER: ICD-10-CM

## 2021-03-26 PROCEDURE — 20610 DRAIN/INJ JOINT/BURSA W/O US: CPT | Mod: RT

## 2021-03-26 PROCEDURE — 99214 OFFICE O/P EST MOD 30 MIN: CPT | Mod: 25

## 2021-03-26 PROCEDURE — 73030 X-RAY EXAM OF SHOULDER: CPT | Mod: RT

## 2021-03-26 NOTE — PROCEDURE
[Aspiration] : Aspiration [Injection] : Injection [Right] : of the right [Knee Joint] : knee joint [Effusion] : Effusion [Osteoarthritis] : Osteoarthritis [Patient] : patient [Risk] : risk [Benefits] : benefits [Alternatives] : alternatives [Bleeding] : bleeding [Infection] : infection [Allergic Reaction] : allergic reaction [Verbal Consent Obtained] : verbal consent was obtained prior to the procedure [Same Needle/New Syringe] : the syringe was changed and the same needle was left in place and [Bandage Applied] : a bandage [Tolerated Well] : The patient tolerated the procedure well [None] : none [Ethyl Chloride Spray] : ethyl chloride spray was used as a topical anesthetic [Lateral] : lateral [20] : a 20-gauge [___ mL Fluid] : [unfilled] mL of [Yellow] : yellow [Clear] : clear [1% Lidocaine___(mL)] : [unfilled] mL of 1% Lidocaine [Methylpred. 40mg/mL___(mL)] : [unfilled] mL of 40mg/mL methylprednisolone [No Strenuous Activity___day(s)] : avoid strenuous activity for [unfilled] day(s)

## 2021-03-26 NOTE — PHYSICAL EXAM
[Slightly Antalgic] : slightly antalgic [Crutches] : ambulates with crutches [DP] : dorsalis pedis 2+ and symmetric bilaterally [PT] : posterior tibial 2+ and symmetric bilaterally [Knee Motion Right] : full ROM [Knee Swelling Left] : swelling [Knee Tenderness On Palpation Left] : tenderness [Knee Motion Left Crepitus] : crepitus with ROM [Normal RUE] : Right Upper Extremity: No scars, rashes, lesions, ulcers, skin intact [Normal LUE] : Left Upper Extremity: No scars, rashes, lesions, ulcers, skin intact [Normal RLE] : Right Lower Extremity: No scars, rashes, lesions, ulcers, skin intact [Normal LLE] : Left Lower Extremity: No scars, rashes, lesions, ulcers, skin intact [Normal Touch] : sensation intact for touch [Normal] : Oriented to person, place, and time, insight and judgement were intact and the affect was normal [UE/LE] : Sensory: Intact in bilateral upper & lower extremities [Knee Swelling Right] : no swelling [Knee Tenderness On Palpation Right] : no tenderness [Knee Motion Right Crepitus] : no crepitus with ROM [Knee Stability / Maneuvers] : negative patellofemoral apprehension test [Knee Anterior Drawer Sign Right] : negative anterior drawer sign [Knee Posterior Drawer Sign Right] : negative posterior drawer sign [Knee Meniscal Integ Julio César's Displace Test Right Medial] : negative Julio César's medially [Knee Meniscal Integ Julio César's Displace Test Right Lateral] : negative Julio César's laterally [Knee Instability Laxity Right Anterior Cruciate Ligament] : negative pivot shift test [Knee Medial Instability Right] : no laxity on valgus stress [Knee Lateral Instability Right] : no laxity on varus stress [Knee Motion Left] : limited ROM [Patellofemoral Apprehension Test Left] : negative patellofemoral apprehension test [Knee Anterior Drawer Sign Left] : negative anterior drawer sign [Knee Posterior Drawer Sign Left] : negative posterior drawer sign [Knee Tender On Palp With Quadriceps Contracted (Shrug Sign)] : negative patellar grind [Knee Instability Laxity Left Anterior Cruciate Ligament] : negative pivot shift test [Knee Medial Instability Left] : no laxity on valgus stress [Knee Lateral Instability Left] : no  laxity on varus stress [de-identified] : Knees\par Nonantalgic gait.\par 1 + RIGHT knee effusion without significant warmth. No erythema or ecchymoses.\par - LEFT knee effusion. No erythema.\par Range of motion is 5 -125° on the RIGHT versus 5° to 125°with pain on the LEFT.  +pain RIGHT knee on full flexion\par Tender mildly medial joint line LEFT knee. Tender RIGHT knee medial joint line\par - Marina.\par Normal varus and valgus and AP stability..\par Normal neurovascular exam.\par \par Right shoulder\par No edema, ecchymosis, erythema.\par Range of motion is mildly limited in all directions on the right shoulder compared to the left.  There is 175 degrees forward elevation with pain on forward elevation, internal rotation to L3 versus T9.\par External rotation with the arm at the side is to about 50 degrees on the right versus 65 degrees on the left.\par Positive Neer and Day in the right shoulder.\par Motor is good with 5/5 supraspinatus, internal rotation, external rotation and biceps. [de-identified] : no respiratory distress or cough [de-identified] : \par x-rays bilateral knees WB 4 views Sept 2020 showed Medial compartment narrowing Moderate LEFT and minimal RIGHT with small osteophytes and sclerosis. Other compartments are relatively well maintained as is the RIGHT knee x-rays weightbearing for views without significant arthritis \par \par MRI right knee on 10/19/20 showed a complex tear near meniscal root and posterior horn and mild cartilage wear PF and medial compartments.\par \par x-rays of the right shoulder 3 views today shows small inferior humeral head osteophyte with mild narrowing glenohumeral joint which can can be consistent with mild glenohumeral osteoarthritis.  Mild AC joint arthrosis and acromial spur.

## 2021-03-26 NOTE — ASSESSMENT
[FreeTextEntry1] : 70-year-old woman with left knee medial compartment arthritis, right knee mild arthritis and had a complex degenerative medial meniscus tear about 8 months ago doing much better but still has some pain and swelling recently.  Today an aspiration and cortisone injection was done in the right knee which hopefully will help.  If pain is ongoing we may try the hyaluronic acid injections next time.  Continue with physical therapy and strengthening exercises.  Heat and ice as needed.  Voltaren gel as needed.  She can take very limited oral nonsteroidal anti-inflammatories.\par For the shoulder she has some mild glenohumeral arthritis which may explain some of the stiffness.  She likely has some rotator cuff tendinopathy as well.  Given the global loss of motion initially I questioned if she could have early stages of an adhesive capsulitis but I think that is less likely than the arthritis.  We will see how this evolves over time to make a better diagnosis.  If she is having ongoing pain she can go for diagnostic ultrasound and cortisone injection can be done at the same time.  Otherwise she should follow-up in about 6 to 8 weeks so I can check and see how it is doing and why she is feeling slightly better.\par She will be very careful with lifting the baby and other activities that aggravate the shoulder but we want to keep the shoulder moving to prevent further stiffness.\par We may consider an MRI or other work-up or injection as needed.

## 2021-03-26 NOTE — HISTORY OF PRESENT ILLNESS
[de-identified] : Ms. Yanez comes in for f/u for RIGHT knee with a posterior horn MMT. She injured it about 5 mos ago. Her pain has been variable.  Recently it is not nearly as bad as it had been when I saw her several months ago.  She is walking more but still has pain that can be mild to moderate and is more intermittent.\par It can feel tight.\par She does not take any anti-inflammatories because she does not tolerate them well.\par She has been trying to exercise although recently backed off because of the pain.  \par \par The right shoulder Started hurting 3 to 4 weeks ago without any specific injury but she does have a new grandchild and has been lifting her the baby more.  She has pain lifting the arm and reaching behind her.  Putting on her bra can be painful.  Pain was worse about a week ago.  Reaching behind her back is very painful.   she has had some mild prior shoulder pain but was not this bad in the past.  She had been given some shoulder exercises to do but stopped doing them because the shoulder was hurting more.

## 2021-10-29 PROBLEM — S83.231A COMPLEX TEAR OF MEDIAL MENISCUS OF RIGHT KNEE AS CURRENT INJURY, INITIAL ENCOUNTER: Status: ACTIVE | Noted: 2020-10-19

## 2021-11-01 ENCOUNTER — APPOINTMENT (OUTPATIENT)
Dept: ORTHOPEDIC SURGERY | Facility: CLINIC | Age: 71
End: 2021-11-01
Payer: MEDICARE

## 2021-11-01 DIAGNOSIS — S83.231A COMPLEX TEAR OF MEDIAL MENISCUS, CURRENT INJURY, RIGHT KNEE, INITIAL ENCOUNTER: ICD-10-CM

## 2021-11-01 DIAGNOSIS — S83.242D OTHER TEAR OF MEDIAL MENISCUS, CURRENT INJURY, LEFT KNEE, SUBSEQUENT ENCOUNTER: ICD-10-CM

## 2021-11-01 PROCEDURE — 20610 DRAIN/INJ JOINT/BURSA W/O US: CPT | Mod: 50

## 2021-11-01 PROCEDURE — 99213 OFFICE O/P EST LOW 20 MIN: CPT | Mod: 25

## 2021-11-01 PROCEDURE — 73564 X-RAY EXAM KNEE 4 OR MORE: CPT | Mod: 50

## 2021-11-01 NOTE — HISTORY OF PRESENT ILLNESS
[de-identified] : Ms. Yanez comes in for f/u for bilateral knee pain worse on the right than left that is gotten progressively worse over the years.  Last year we had done steroid injections and hyaluronic acid injections.  She last had a steroid injection in March 2021 in the right knee.  She does try to exercise regularly.  Pain is quite variable.  She will take occasional Celebrex when the pain is bad but she needs to minimize Celebrex because of her stomach.  Tylenol does not seem to help much.  She does do some exercises to strengthen and rides a bike and elliptical and tries to stay active.

## 2021-11-01 NOTE — PHYSICAL EXAM
[Slightly Antalgic] : slightly antalgic [Crutches] : ambulates with crutches [UE/LE] : Sensory: Intact in bilateral upper & lower extremities [DP] : dorsalis pedis 2+ and symmetric bilaterally [PT] : posterior tibial 2+ and symmetric bilaterally [Knee Motion Right] : full ROM [Knee Swelling Left] : swelling [Knee Tenderness On Palpation Left] : tenderness [Knee Motion Left Crepitus] : crepitus with ROM [Normal RUE] : Right Upper Extremity: No scars, rashes, lesions, ulcers, skin intact [Normal LUE] : Left Upper Extremity: No scars, rashes, lesions, ulcers, skin intact [Normal RLE] : Right Lower Extremity: No scars, rashes, lesions, ulcers, skin intact [Normal LLE] : Left Lower Extremity: No scars, rashes, lesions, ulcers, skin intact [Normal Touch] : sensation intact for touch [Normal] : No swelling, no edema, normal pedal pulses and normal temperature [Knee Swelling Right] : no swelling [Knee Tenderness On Palpation Right] : no tenderness [Knee Motion Right Crepitus] : no crepitus with ROM [Knee Stability / Maneuvers] : negative patellofemoral apprehension test [Knee Anterior Drawer Sign Right] : negative anterior drawer sign [Knee Posterior Drawer Sign Right] : negative posterior drawer sign [Knee Meniscal Integ Julio César's Displace Test Right Medial] : negative Julio César's medially [Knee Meniscal Integ Julio César's Displace Test Right Lateral] : negative Julio César's laterally [Knee Instability Laxity Right Anterior Cruciate Ligament] : negative pivot shift test [Knee Medial Instability Right] : no laxity on valgus stress [Knee Lateral Instability Right] : no laxity on varus stress [Knee Motion Left] : limited ROM [Patellofemoral Apprehension Test Left] : negative patellofemoral apprehension test [Knee Anterior Drawer Sign Left] : negative anterior drawer sign [Knee Posterior Drawer Sign Left] : negative posterior drawer sign [Knee Tender On Palp With Quadriceps Contracted (Shrug Sign)] : negative patellar grind [Knee Instability Laxity Left Anterior Cruciate Ligament] : negative pivot shift test [Knee Medial Instability Left] : no laxity on valgus stress [Knee Lateral Instability Left] : no  laxity on varus stress [de-identified] : Knees\par Nonantalgic gait.\par -RIGHT knee effusion. No warmth. No erythema or ecchymoses.\par - LEFT knee effusion. No erythema.\par Range of motion is 5 -120° on the RIGHT versus 0° to 125°with pain on the LEFT.  +pain RIGHT knee on full flexion\par Tender mildly medial joint line LEFT knee. Tender RIGHT knee medial joint line\par - Marina.\par Normal varus and valgus and AP stability..\par Normal neurovascular exam.\par  [de-identified] : no respiratory distress or cough [de-identified] : \par x-rays bilateral knees WB 4 views Sept 2020 showed Medial compartment narrowing Moderate LEFT and minimal RIGHT with small osteophytes and sclerosis. Other compartments are relatively well maintained as is the RIGHT knee x-rays weightbearing for views without significant arthritis \par \par MRI right knee on 10/19/20 showed a complex tear near meniscal root and posterior horn and mild cartilage wear PF and medial compartments.\par \par x-rays of the right knee WB 4 views today showed moderate to severe medial joint space narrowing with osteophytes tricompartmental greatest medially right greater than left knee.  Also in the right knee there is evidence of a subchondral cyst in the medial femoral condyle

## 2021-11-01 NOTE — PROCEDURE
[Injection] : Injection [Bilateral] : of bilateral [Knee Joint] : knee joint [Osteoarthritis] : Osteoarthritis [Patient] : patient [Risk] : risk [Benefits] : benefits [Alternatives] : alternatives [Bleeding] : bleeding [Infection] : infection [Allergic Reaction] : allergic reaction [Verbal Consent Obtained] : verbal consent was obtained prior to the procedure [Alcohol] : Alcohol [Betadine] : Betadine [Ethyl Chloride Spray] : ethyl chloride spray was used as a topical anesthetic [___mL] : [unfilled] ~UmL of lidocaine [Lateral] : lateral [20] : a 20-gauge [Bandage Applied] : a bandage [Tolerated Well] : The patient tolerated the procedure well [None] : none [No Strenuous Activity___day(s)] : avoid strenuous activity for [unfilled] day(s) [PRN] : as needed [[facility]8] : [id number]

## 2021-11-01 NOTE — ASSESSMENT
[FreeTextEntry1] : 71-year-old woman with bilateral knee moderate to approaching severe medial compartment osteoarthritis.  Arthritis is not quite bone-on-bone.  There is tricompartmental changes.  In the right knee the arthritis has progressed significantly in the past year.\par We talked about nonoperative and operative treatment for knee arthritis.  We are going to try the hyaluronic acid injections which have given her some relief in the past and that was done today.  She should continue to do the strengthening exercises.  Warm soaks and ice as needed.  Tylenol or Celebrex as needed for the pain.\par She is still able to be relatively active although does experience a fair bit of pain.\par We talked about time to consider surgery.  I think we should try the injections first and see if these help before we consider surgery/knee replacement.\par She will follow-up as needed.

## 2022-01-14 ENCOUNTER — APPOINTMENT (OUTPATIENT)
Dept: ORTHOPEDIC SURGERY | Facility: CLINIC | Age: 72
End: 2022-01-14
Payer: MEDICARE

## 2022-01-14 DIAGNOSIS — M70.62 TROCHANTERIC BURSITIS, LEFT HIP: ICD-10-CM

## 2022-01-14 PROCEDURE — 99214 OFFICE O/P EST MOD 30 MIN: CPT

## 2022-01-14 RX ORDER — DICLOFENAC EPOLAMINE 0.01 G/1
1.3 SYSTEM TOPICAL TWICE DAILY
Qty: 60 | Refills: 1 | Status: ACTIVE | COMMUNITY
Start: 2022-01-14 | End: 1900-01-01

## 2022-01-14 NOTE — ASSESSMENT
[FreeTextEntry1] : 71-year-old with bilateral knee osteoarthritis and a flexion contracture on the right doing better since the hyaluronic acid injections now with about 4 weeks of lateral left hip pain most consistent with greater trochanteric bursitis.  I recommended avoiding any aggravating activity whether it be too much exercise or carrying the baby.  Heat and ice and NSAIDs as needed.  I prescribed Flector patch which would not be likely to aggravate her stomach like oral NSAIDs which she can only take on a very limited basis.  Physical therapy and home exercises were given.  Follow-up if its not getting better in the next month or so in which case we may get x-rays and consider an injection.\par The hyaluronic acid injections seem to help her knees which are doing better now.

## 2022-01-14 NOTE — PHYSICAL EXAM
[LE] : Sensory: Intact in bilateral lower extremities [Knee Swelling Right] : no swelling [Knee Tenderness On Palpation Right] : no tenderness [Knee Motion Right Crepitus] : no crepitus with ROM [Knee Stability / Maneuvers] : negative patellofemoral apprehension test [Knee Anterior Drawer Sign Right] : negative anterior drawer sign [Knee Posterior Drawer Sign Right] : negative posterior drawer sign [Knee Meniscal Integ Julio César's Displace Test Right Medial] : negative Julio César's medially [Knee Meniscal Integ Julio César's Displace Test Right Lateral] : negative Julio César's laterally [Knee Instability Laxity Right Anterior Cruciate Ligament] : negative pivot shift test [Knee Medial Instability Right] : no laxity on valgus stress [Knee Lateral Instability Right] : no laxity on varus stress [Knee Motion Left] : limited ROM [Patellofemoral Apprehension Test Left] : negative patellofemoral apprehension test [Knee Anterior Drawer Sign Left] : negative anterior drawer sign [Knee Posterior Drawer Sign Left] : negative posterior drawer sign [Knee Tender On Palp With Quadriceps Contracted (Shrug Sign)] : negative patellar grind [Knee Instability Laxity Left Anterior Cruciate Ligament] : negative pivot shift test [Knee Medial Instability Left] : no laxity on valgus stress [Knee Lateral Instability Left] : no  laxity on varus stress [de-identified] : Knees\par Nonantalgic gait.\par -RIGHT knee effusion. No warmth. No erythema or ecchymoses.\par - LEFT knee effusion. No erythema.\par Range of motion is 5 -120° on the RIGHT versus 0° to 125°with pain on the LEFT.  +pain RIGHT knee on full flexion\par Tender mildly medial joint line LEFT knee. Tender RIGHT knee medial joint line\par - Marina.\par Normal varus and valgus and AP stability..\par Normal neurovascular exam.\par \par Left hip\par Tender over the greater trochanter and distal gluteus medius.\par Intact straight leg raise and hip abduction.\par Hip joint range of motion is good without any groin pain.\par No lumbar tenderness or tenderness in the sciatic notch.\par Negative straight leg raise. [de-identified] : no respiratory distress or cough [de-identified] : \par x-rays of the right knee WB 4 views November 1, 2021 showed moderate to severe medial joint space narrowing with osteophytes tricompartmental greatest medially right greater than left knee.  In the right knee there was evidence of a subchondral cyst in the medial femoral condyle

## 2022-01-14 NOTE — HISTORY OF PRESENT ILLNESS
[de-identified] : Ms. Yanez comes in for with pain in her left lateral hip going on for about a month that is variable but tends to be worse walking.  It does not keep her up typically at night.  If she is pushing the baby stroller it tends to be better.  Her grandson is now about a year old or so and obviously getting bigger.  She can take occasional Celebrex but cannot take it regularly because of GI issues.\par Her knees are doing better since last visit 2-1/2 months ago Monovisc injections were done in both knees

## 2022-09-23 ENCOUNTER — APPOINTMENT (OUTPATIENT)
Dept: ORTHOPEDIC SURGERY | Facility: CLINIC | Age: 72
End: 2022-09-23

## 2022-09-23 PROCEDURE — 99213 OFFICE O/P EST LOW 20 MIN: CPT | Mod: 25

## 2022-09-23 PROCEDURE — 20610 DRAIN/INJ JOINT/BURSA W/O US: CPT | Mod: 59,RT

## 2022-09-23 NOTE — HISTORY OF PRESENT ILLNESS
[de-identified] : Ms. Yanez comes in for with pain primarily in her right greater than left knee related to the arthritis.  We last did hyaluronic acid injection in November 2021.  She has been swimming a lot and exercises and had done some physical therapy earlier in the year related to the left hip pain.\par No swelling or locking or buckling.  The pain in the knees has been achy and sore and variable.  The last few days has been pretty good but that had been bothering her more before that.  She is leaving in a few weeks to go on a vacation.\par She was thinking about having injection began in the right knee.\par She just takes Tylenol intermittently if needed but nothing on a regular basis

## 2022-09-23 NOTE — ASSESSMENT
[FreeTextEntry1] : 72-year-old with bilateral knee osteoarthritis and a flexion contracture on the right more severe on the right.  Hyaluronic acid injection was done today in the right knee only.\par It had been about 10 months since the last injection so she did get good relatively long-lasting relief.  She has lost weight which also likely helped and she is exercising.\par Hopefully will give her good long-lasting relief.  Next time she comes in we will get x-rays.\par Follow-up as needed

## 2022-09-23 NOTE — PHYSICAL EXAM
[Slightly Antalgic] : slightly antalgic [Crutches] : ambulates with crutches [LE] : Sensory: Intact in bilateral lower extremities [DP] : dorsalis pedis 2+ and symmetric bilaterally [PT] : posterior tibial 2+ and symmetric bilaterally [Knee Motion Right] : full ROM [Knee Swelling Left] : swelling [Knee Tenderness On Palpation Left] : tenderness [Knee Motion Left Crepitus] : crepitus with ROM [Normal RLE] : Right Lower Extremity: No scars, rashes, lesions, ulcers, skin intact [Normal LLE] : Left Lower Extremity: No scars, rashes, lesions, ulcers, skin intact [Normal Touch] : sensation intact for touch [Normal] : Oriented to person, place, and time, insight and judgement were intact and the affect was normal [Knee Swelling Right] : no swelling [Knee Tenderness On Palpation Right] : no tenderness [Knee Motion Right Crepitus] : no crepitus with ROM [Knee Stability / Maneuvers] : negative patellofemoral apprehension test [Knee Anterior Drawer Sign Right] : negative anterior drawer sign [Knee Posterior Drawer Sign Right] : negative posterior drawer sign [Knee Meniscal Integ Julio César's Displace Test Right Medial] : negative Julio César's medially [Knee Meniscal Integ Julio César's Displace Test Right Lateral] : negative Julio César's laterally [Knee Instability Laxity Right Anterior Cruciate Ligament] : negative pivot shift test [Knee Medial Instability Right] : no laxity on valgus stress [Knee Lateral Instability Right] : no laxity on varus stress [Knee Motion Left] : limited ROM [Patellofemoral Apprehension Test Left] : negative patellofemoral apprehension test [Knee Anterior Drawer Sign Left] : negative anterior drawer sign [Knee Posterior Drawer Sign Left] : negative posterior drawer sign [Knee Tender On Palp With Quadriceps Contracted (Shrug Sign)] : negative patellar grind [Knee Instability Laxity Left Anterior Cruciate Ligament] : negative pivot shift test [Knee Medial Instability Left] : no laxity on valgus stress [Knee Lateral Instability Left] : no  laxity on varus stress [de-identified] : Knees\par Slight limp\par -RIGHT knee effusion. No warmth. No erythema or ecchymoses.\par - LEFT knee effusion. No erythema.\par Range of motion is 5 -120° on the RIGHT versus 0° to 125°with pain on the LEFT.  +pain RIGHT knee on full flexion\par Tender mildly medial joint line LEFT knee. Tender RIGHT knee medial joint line\par - Marina.\par Normal varus and valgus and AP stability..\par Normal neurovascular exam.\par \par With hip and knee range of motion there is some pain in the left lateral hip and low back. [de-identified] : no respiratory distress or cough [de-identified] : \par x-rays of the right knee WB 4 views November 1, 2021 showed moderate to severe medial joint space narrowing with osteophytes tricompartmental greatest medially right greater than left knee.  In the right knee there was evidence of a subchondral cyst in the medial femoral condyle

## 2022-09-23 NOTE — PROCEDURE
Detail Level: Zone [Injection] : Injection [Right] : of the right [Knee Joint] : knee joint [Osteoarthritis] : Osteoarthritis [Patient] : patient [Risk] : risk [Benefits] : benefits [Alternatives] : alternatives [Bleeding] : bleeding [Infection] : infection [Allergic Reaction] : allergic reaction [Verbal Consent Obtained] : verbal consent was obtained prior to the procedure [Alcohol] : Alcohol [___mL] : [unfilled] ~UmL of lidocaine [Lateral] : lateral [20] : a 20-gauge [Bandage Applied] : a bandage [Tolerated Well] : The patient tolerated the procedure well [None] : none [No Strenuous Activity___day(s)] : avoid strenuous activity for [unfilled] day(s) [PRN] : as needed [FreeTextEntry1] : chloraprep [FreeTextEntry8] : Monovisc lot [id number], exp 4/30/24

## 2023-01-03 ENCOUNTER — APPOINTMENT (OUTPATIENT)
Dept: ORTHOPEDIC SURGERY | Facility: CLINIC | Age: 73
End: 2023-01-03
Payer: MEDICARE

## 2023-01-03 DIAGNOSIS — M94.261 CHONDROMALACIA, RIGHT KNEE: ICD-10-CM

## 2023-01-03 DIAGNOSIS — M25.561 PAIN IN RIGHT KNEE: ICD-10-CM

## 2023-01-03 PROCEDURE — 99213 OFFICE O/P EST LOW 20 MIN: CPT

## 2023-01-03 PROCEDURE — 73564 X-RAY EXAM KNEE 4 OR MORE: CPT | Mod: 50

## 2023-01-03 NOTE — PHYSICAL EXAM
[Slightly Antalgic] : slightly antalgic [Crutches] : ambulates with crutches [LE] : Sensory: Intact in bilateral lower extremities [DP] : dorsalis pedis 2+ and symmetric bilaterally [PT] : posterior tibial 2+ and symmetric bilaterally [Knee Motion Right] : full ROM [Knee Swelling Left] : swelling [Knee Tenderness On Palpation Left] : tenderness [Knee Motion Left Crepitus] : crepitus with ROM [Normal RLE] : Right Lower Extremity: No scars, rashes, lesions, ulcers, skin intact [Normal LLE] : Left Lower Extremity: No scars, rashes, lesions, ulcers, skin intact [Normal Touch] : sensation intact for touch [Normal] : Oriented to person, place, and time, insight and judgement were intact and the affect was normal [Knee Swelling Right] : no swelling [Knee Tenderness On Palpation Right] : no tenderness [Knee Motion Right Crepitus] : no crepitus with ROM [Knee Stability / Maneuvers] : negative patellofemoral apprehension test [Knee Anterior Drawer Sign Right] : negative anterior drawer sign [Knee Posterior Drawer Sign Right] : negative posterior drawer sign [Knee Meniscal Integ Julio César's Displace Test Right Medial] : negative Julio César's medially [Knee Meniscal Integ Julio César's Displace Test Right Lateral] : negative Julio César's laterally [Knee Instability Laxity Right Anterior Cruciate Ligament] : negative pivot shift test [Knee Medial Instability Right] : no laxity on valgus stress [Knee Lateral Instability Right] : no laxity on varus stress [Knee Motion Left] : limited ROM [Patellofemoral Apprehension Test Left] : negative patellofemoral apprehension test [Knee Anterior Drawer Sign Left] : negative anterior drawer sign [Knee Posterior Drawer Sign Left] : negative posterior drawer sign [Knee Tender On Palp With Quadriceps Contracted (Shrug Sign)] : negative patellar grind [Knee Instability Laxity Left Anterior Cruciate Ligament] : negative pivot shift test [Knee Medial Instability Left] : no laxity on valgus stress [Knee Lateral Instability Left] : no  laxity on varus stress [de-identified] : Knees\par She is walking on a mildly flexed right knee with a mild limp\par -RIGHT knee effusion. No warmth. No erythema or ecchymoses.\par - LEFT knee effusion. No erythema.\par Range of motion is 5 -120° on the RIGHT versus 0° to 125°with pain on the LEFT.  +pain RIGHT knee on full flexion\par Tender mildly medial joint line LEFT knee. Tender RIGHT knee medial joint line\par - Marina.\par Normal varus and valgus and AP stability..\par Normal neurovascular exam.\par \par With hip and knee range of motion there is some pain in the left lateral hip and low back. [de-identified] : no respiratory distress or cough [de-identified] : \par x-rays of the knees weightbearing 4 views today compared to right knee WB 4 views November 1, 2021 showed moderate to severe medial joint space narrowing with osteophytes tricompartmental greatest medially right greater than left knee.  There is some progression of the arthritis but still not bone-on-bone.  In the right knee there was evidence of a subchondral cyst in the medial femoral condyle.\par Mild patellofemoral arthritis.

## 2023-01-03 NOTE — HISTORY OF PRESENT ILLNESS
[de-identified] : Ms. Yanez comes in for with pain primarily in her right greater than left knee related to the arthritis.  We last did Monovisc hyaluronic acid injection in Sept 2022 which helped partially for a while but never fully better.. \par She developed severe right knee pain in the last week.  She was helping to take care of her grandchild and doing more activity.  There was no other specific injury that occurred.  Pain is more in the anterior knee.  No significant swelling or locking or buckling.\par She has been taking Celebrex twice a day but is not sure if it is 100 or 200 mg tablets.\par In the last 2 days she did a lot less activity and her knee does feel a good bit better today than it had been

## 2023-01-03 NOTE — ASSESSMENT
[FreeTextEntry1] : 72-year-old right knee osteoarthritis severe medial compartment with increased pain recently.  It may have been from caring for her grandchild which puts extra stress on the knee.  With rest it seems to be settling down.  She does have some chronic knee pain and at some point knee replacement may be considered.\par We talked about whether or not to do a steroid injection but in the past this was never very successful for her.  It is too early to do the hyaluronic acid injections.  She should do her straight leg raises and ice and use Voltaren gel and lidocaine patches and take limited Celebrex if needed.  She will come in next week if its not getting better and we could do a steroid injection.

## 2023-06-15 ENCOUNTER — APPOINTMENT (OUTPATIENT)
Dept: ORTHOPEDIC SURGERY | Facility: CLINIC | Age: 73
End: 2023-06-15
Payer: MEDICARE

## 2023-06-15 PROCEDURE — 20610 DRAIN/INJ JOINT/BURSA W/O US: CPT | Mod: RT

## 2023-06-15 PROCEDURE — 99213 OFFICE O/P EST LOW 20 MIN: CPT | Mod: 25

## 2023-06-15 NOTE — ASSESSMENT
[FreeTextEntry1] : 73-year-old right knee osteoarthritis severe medial compartment originally more in the right than left knee but the left knee has gotten more arthritic over time as well.  Her symptoms are still worse on the right.  It had been at least 8 months since the last hyaluronic acid injection which was Monovisc.  She thinks maybe Synvisc was better but is not sure.  Today we did Synvisc 1 injection on the right side.  She should ice and can take a little Celebrex.  Continue with knee exercises in the swimming is good for her knee.  If she starts to have more frequent episodes of significant pain I think that is when she should consider the knee replacement.  Fortunately the pain always seems to subside which is good.  She will follow-up as needed.

## 2023-06-15 NOTE — PHYSICAL EXAM
[LE] : Sensory: Intact in bilateral lower extremities [Normal RLE] : Right Lower Extremity: No scars, rashes, lesions, ulcers, skin intact [Normal LLE] : Left Lower Extremity: No scars, rashes, lesions, ulcers, skin intact [Normal Touch] : sensation intact for touch [Normal] : Oriented to person, place, and time, insight and judgement were intact and the affect was normal [Slightly Antalgic] : slightly antalgic [Crutches] : ambulates with crutches [DP] : dorsalis pedis 2+ and symmetric bilaterally [PT] : posterior tibial 2+ and symmetric bilaterally [Knee Motion Right] : full ROM [Knee Swelling Left] : swelling [Knee Tenderness On Palpation Left] : tenderness [Knee Motion Left Crepitus] : crepitus with ROM [Knee Swelling Right] : no swelling [Knee Tenderness On Palpation Right] : no tenderness [Knee Motion Right Crepitus] : no crepitus with ROM [Knee Stability / Maneuvers] : negative patellofemoral apprehension test [Knee Anterior Drawer Sign Right] : negative anterior drawer sign [Knee Posterior Drawer Sign Right] : negative posterior drawer sign [Knee Meniscal Integ Julio César's Displace Test Right Medial] : negative Julio César's medially [Knee Meniscal Integ Julio César's Displace Test Right Lateral] : negative Julio César's laterally [Knee Instability Laxity Right Anterior Cruciate Ligament] : negative pivot shift test [Knee Medial Instability Right] : no laxity on valgus stress [Knee Lateral Instability Right] : no laxity on varus stress [Knee Motion Left] : limited ROM [Patellofemoral Apprehension Test Left] : negative patellofemoral apprehension test [Knee Anterior Drawer Sign Left] : negative anterior drawer sign [Knee Posterior Drawer Sign Left] : negative posterior drawer sign [Knee Tender On Palp With Quadriceps Contracted (Shrug Sign)] : negative patellar grind [Knee Instability Laxity Left Anterior Cruciate Ligament] : negative pivot shift test [Knee Medial Instability Left] : no laxity on valgus stress [Knee Lateral Instability Left] : no  laxity on varus stress [de-identified] : Knees\par Nonantalgic gait today.\par - RIGHT knee effusion. No warmth. No erythema or ecchymoses.\par - LEFT knee effusion. No erythema.\par Range of motion is 5 -120° on the RIGHT versus 0° to 125°with pain on the LEFT.  Mild pain RIGHT knee on full flexion and extension\par Tender mildly medial joint line LEFT knee. Tender RIGHT knee medial joint line\par - Marina.\par Normal varus and valgus and AP stability..\par Normal neurovascular exam.\par  [de-identified] : no respiratory distress or cough [de-identified] : \par \par x-rays of the knees weightbearing 4 views 1/3/23 compared to right knee WB 4 views November 1, 2021 showed moderate to severe medial joint space narrowing with osteophytes tricompartmental greatest medially right greater than left knee. There is some progression of the arthritis but still not bone-on-bone. In the right knee there was evidence of a subchondral cyst in the medial femoral condyle.\par Mild patellofemoral arthritis. \par KL 4

## 2023-06-15 NOTE — PROCEDURE
[Injection] : Injection [Knee Joint] : knee joint [Osteoarthritis] : Osteoarthritis [Patient] : patient [Risk] : risk [Benefits] : benefits [Alternatives] : alternatives [Bleeding] : bleeding [Infection] : infection [Allergic Reaction] : allergic reaction [Verbal Consent Obtained] : verbal consent was obtained prior to the procedure [Alcohol] : Alcohol [Ethyl Chloride Spray] : ethyl chloride spray was used as a topical anesthetic [Lateral] : lateral [20] : a 20-gauge [Bandage Applied] : a bandage [Tolerated Well] : The patient tolerated the procedure well [None] : none [No Strenuous Activity___day(s)] : avoid strenuous activity for [unfilled] day(s) [PRN] : as needed [Right] : of the right [___mL] : [unfilled] ~UmL of lidocaine [FreeTextEntry1] : chloraprep [FreeTextEntry8] : Synvisc One Lot # [id number], exp 12/31/25

## 2023-06-15 NOTE — HISTORY OF PRESENT ILLNESS
[de-identified] : Ms. Yanez is 74 yo and comes in for with pain primarily in her right greater than left knee related to the arthritis. We last did Monovisc hyaluronic acid injection in Sept 2022 which helped partially.  It took a while to kick in but then seemed to help.  She gets intermittent episodes of pain and stiffness.  Today her knee is feeling relatively good so she is less anxious to have another injection but when she made the appointment she had multiple days in a row where the knee was hurting more.  She gets intermittent stabbing pains but usually more of a dull aching pain.  When her knee is bad she will take Celebrex for a few days but most days does not take anything.  At 1 point she felt like she did with the meniscus tear

## 2024-01-18 PROBLEM — M17.11 ARTHRITIS OF KNEE, RIGHT: Status: ACTIVE | Noted: 2022-09-23

## 2024-01-22 ENCOUNTER — APPOINTMENT (OUTPATIENT)
Dept: ORTHOPEDIC SURGERY | Facility: CLINIC | Age: 74
End: 2024-01-22
Payer: MEDICARE

## 2024-01-22 DIAGNOSIS — M24.562 CONTRACTURE, LEFT KNEE: ICD-10-CM

## 2024-01-22 DIAGNOSIS — M25.562 PAIN IN RIGHT KNEE: ICD-10-CM

## 2024-01-22 DIAGNOSIS — M25.561 PAIN IN RIGHT KNEE: ICD-10-CM

## 2024-01-22 DIAGNOSIS — M17.11 UNILATERAL PRIMARY OSTEOARTHRITIS, RIGHT KNEE: ICD-10-CM

## 2024-01-22 DIAGNOSIS — M17.12 UNILATERAL PRIMARY OSTEOARTHRITIS, LEFT KNEE: ICD-10-CM

## 2024-01-22 DIAGNOSIS — M17.0 BILATERAL PRIMARY OSTEOARTHRITIS OF KNEE: ICD-10-CM

## 2024-01-22 DIAGNOSIS — M24.561 CONTRACTURE, RIGHT KNEE: ICD-10-CM

## 2024-01-22 DIAGNOSIS — G89.29 PAIN IN RIGHT KNEE: ICD-10-CM

## 2024-01-22 PROCEDURE — 73564 X-RAY EXAM KNEE 4 OR MORE: CPT | Mod: 50

## 2024-01-22 PROCEDURE — 20610 DRAIN/INJ JOINT/BURSA W/O US: CPT | Mod: LT

## 2024-01-22 PROCEDURE — 99213 OFFICE O/P EST LOW 20 MIN: CPT | Mod: 25

## 2024-01-22 NOTE — PHYSICAL EXAM
[Slightly Antalgic] : slightly antalgic [Crutches] : ambulates with crutches [LE] : Sensory: Intact in bilateral lower extremities [DP] : dorsalis pedis 2+ and symmetric bilaterally [PT] : posterior tibial 2+ and symmetric bilaterally [Knee Motion Right] : full ROM [Knee Tenderness On Palpation Left] : tenderness [Knee Swelling Left] : swelling [Knee Motion Left Crepitus] : crepitus with ROM [Normal RLE] : Right Lower Extremity: No scars, rashes, lesions, ulcers, skin intact [Normal LLE] : Left Lower Extremity: No scars, rashes, lesions, ulcers, skin intact [Normal Touch] : sensation intact for touch [Normal] : Oriented to person, place, and time, insight and judgement were intact and the affect was normal [Knee Swelling Right] : no swelling [Knee Tenderness On Palpation Right] : no tenderness [Knee Motion Right Crepitus] : no crepitus with ROM [Knee Stability / Maneuvers] : negative patellofemoral apprehension test [Knee Anterior Drawer Sign Right] : negative anterior drawer sign [Knee Posterior Drawer Sign Right] : negative posterior drawer sign [Knee Meniscal Integ Julio César's Displace Test Right Medial] : negative Julio César's medially [Knee Meniscal Integ Julio César's Displace Test Right Lateral] : negative Julio César's laterally [Knee Instability Laxity Right Anterior Cruciate Ligament] : negative pivot shift test [Knee Lateral Instability Right] : no laxity on varus stress [Knee Medial Instability Right] : no laxity on valgus stress [Knee Motion Left] : limited ROM [Knee Anterior Drawer Sign Left] : negative anterior drawer sign [Patellofemoral Apprehension Test Left] : negative patellofemoral apprehension test [Knee Posterior Drawer Sign Left] : negative posterior drawer sign [Knee Tender On Palp With Quadriceps Contracted (Shrug Sign)] : negative patellar grind [Knee Instability Laxity Left Anterior Cruciate Ligament] : negative pivot shift test [Knee Lateral Instability Left] : no  laxity on varus stress [Knee Medial Instability Left] : no laxity on valgus stress [de-identified] : Knees Nonantalgic gait today. - RIGHT knee effusion. No warmth. No erythema or ecchymoses. Trace LEFT knee effusion. No erythema. Range of motion is 10 -120 on the RIGHT versus 5 to 120 with pain on the LEFT.  Mild pain RIGHT knee on full flexion and extension Tender mildly medial joint line LEFT knee. Tender RIGHT knee medial joint line - Marina. Negative anterior and posterior drawer normal varus and valgus laxity. Normal neurovascular exam.  [de-identified] : no respiratory distress or cough [de-identified] :  x-rays of the knees weightbearing 4 views were ordered today to follow-up on the knee arthritis and compared to x-rays January 2023.  X-rays WB 4 views showed severe medial joint space narrowing with osteophytes tricompartmental greatest medially and moderate patellofemoral joint.  Widening lateral joint space.  KL 4 changes bilateral knees.  Some progression since last set of x-rays.

## 2024-01-22 NOTE — ASSESSMENT
[FreeTextEntry1] : 74 yo with bilateral knee.  13 severe osteoarthritis medial compartments and moderate in the patellofemoral compartment.  Today the left knee is hurting more than the right.  About 7 months ago we did hyaluronic acid injection in her right knee.  Left knee was last done over 2 years ago so it seemed to last quite long.  There has been progression of the arthritis.  She is functioning okay and not taking medication and is not at a point to consider knee replacement. Today we did hyaluronic acid injection just in the left knee.  If the right knee starts to hurt more we can do that 1. Heat and ice as needed.  Celebrex as needed.  She will resume some physical therapy since she has not been doing knee exercises and try to work on getting the knee straighter since she has flexion contractures in both knees. Follow-up as needed

## 2024-01-22 NOTE — PROCEDURE
[Injection] : Injection [Left] : of the left [Knee Joint] : knee joint [Osteoarthritis] : Osteoarthritis [Patient] : patient [Risk] : risk [Benefits] : benefits [Alternatives] : alternatives [Bleeding] : bleeding [Allergic Reaction] : allergic reaction [Infection] : infection [Verbal Consent Obtained] : verbal consent was obtained prior to the procedure [Alcohol] : Alcohol [Ethyl Chloride Spray] : ethyl chloride spray was used as a topical anesthetic [Lateral] : lateral [20] : a 20-gauge [Bandage Applied] : a bandage [None] : none [Tolerated Well] : The patient tolerated the procedure well [No Strenuous Activity___day(s)] : avoid strenuous activity for [unfilled] day(s) [PRN] : as needed [___mL] : [unfilled] ~UmL of lidocaine [FreeTextEntry1] : chloraprep [FreeTextEntry8] : Monovisc 4ml Lot # [id number], exp 4/30/26

## 2024-01-22 NOTE — HISTORY OF PRESENT ILLNESS
[de-identified] : Ms. Yanez is 72 yo and comes in for with pain primarily in her right greater than left knee related to the arthritis. We last did Synvisc One injection right knee 6/15/23 which helped and the right knee is doing okay right now.  She gets some pain and stiffness but it is not limiting her much.  The left knee we had last injected with Monovisc in 2021.  She states that it did seem to help for a long time but now the left knee is hurting more and she did more activity and there was a little swelling. She tries not to take medication for pain because it can cause side effects or reactions and she prefer not to.  She does walk and she swims.  She has done some physical therapy in the past but not recently.

## 2024-07-31 ENCOUNTER — APPOINTMENT (OUTPATIENT)
Dept: ORTHOPEDIC SURGERY | Facility: CLINIC | Age: 74
End: 2024-07-31
Payer: MEDICARE

## 2024-07-31 DIAGNOSIS — M47.27 OTHER SPONDYLOSIS WITH RADICULOPATHY, LUMBOSACRAL REGION: ICD-10-CM

## 2024-07-31 DIAGNOSIS — M17.0 BILATERAL PRIMARY OSTEOARTHRITIS OF KNEE: ICD-10-CM

## 2024-07-31 PROCEDURE — 99214 OFFICE O/P EST MOD 30 MIN: CPT

## 2024-07-31 RX ORDER — PREDNISONE 10 MG/1
10 TABLET ORAL
Qty: 20 | Refills: 0 | Status: ACTIVE | COMMUNITY
Start: 2024-07-31 | End: 1900-01-01

## 2024-07-31 NOTE — PHYSICAL EXAM
[Crutches] : ambulates with crutches [LE] : Sensory: Intact in bilateral lower extremities [DP] : dorsalis pedis 2+ and symmetric bilaterally [PT] : posterior tibial 2+ and symmetric bilaterally [Knee Motion Right] : full ROM [Knee Swelling Left] : swelling [Knee Tenderness On Palpation Left] : tenderness [Knee Motion Left Crepitus] : crepitus with ROM [Normal RLE] : Right Lower Extremity: No scars, rashes, lesions, ulcers, skin intact [Normal LLE] : Left Lower Extremity: No scars, rashes, lesions, ulcers, skin intact [Normal Touch] : sensation intact for touch [Normal] : Oriented to person, place, and time, insight and judgement were intact and the affect was normal [Slightly Antalgic] : slightly antalgic [Knee Swelling Right] : no swelling [Knee Tenderness On Palpation Right] : no tenderness [Knee Motion Right Crepitus] : no crepitus with ROM [Knee Stability / Maneuvers] : negative patellofemoral apprehension test [Knee Anterior Drawer Sign Right] : negative anterior drawer sign [Knee Posterior Drawer Sign Right] : negative posterior drawer sign [Knee Meniscal Integ Julio César's Displace Test Right Medial] : negative Julio César's medially [Knee Meniscal Integ Julio César's Displace Test Right Lateral] : negative Julio César's laterally [Knee Instability Laxity Right Anterior Cruciate Ligament] : negative pivot shift test [Knee Medial Instability Right] : no laxity on valgus stress [Knee Lateral Instability Right] : no laxity on varus stress [Knee Motion Left] : limited ROM [Patellofemoral Apprehension Test Left] : negative patellofemoral apprehension test [Knee Anterior Drawer Sign Left] : negative anterior drawer sign [Knee Posterior Drawer Sign Left] : negative posterior drawer sign [Knee Tender On Palp With Quadriceps Contracted (Shrug Sign)] : negative patellar grind [Knee Instability Laxity Left Anterior Cruciate Ligament] : negative pivot shift test [Knee Medial Instability Left] : no laxity on valgus stress [Knee Lateral Instability Left] : no  laxity on varus stress [de-identified] : Knees Nonantalgic gait today. No effusion.  No warmth, erythema or ecchymoses. Trace LEFT knee effusion. No erythema. Range of motion is 5 -120 on the RIGHT versus 5 to 120 with pain on the LEFT.  Mild pain and crepitus Tender mildly medial joint line LEFT knee. Tender RIGHT knee medial joint line - Marina. Negative anterior and posterior drawer normal varus and valgus laxity. Normal neurovascular exam.  Low back Mild asymmetry in the spine. Mild tenderness in the left low back to sciatic notch but no severe tenderness.  Nontender greater trochanter. Straight leg raises to 90 degrees bilaterally. Normal gait.  She can walk on her heels and toes without difficulty or pain. Deep tendon reflexes 2+ patella and trace at the Achilles. Sensation intact bilateral lower extremities.  5/5 EHL, anterior tibial tendon and gastrocsoleus.  [de-identified] : no respiratory distress or cough [de-identified] :  She had a DEXA scan performed last week at [facility] showing osteopenia with T-scores approaching osteoporosis.  Upon the image of the lumbar spine there appears to be multilevel degenerative disc disease with asymmetric wear of  1/22/24 x-rays of the knees weightbearing 4 views WB 4 views showed severe medial joint space narrowing with osteophytes tricompartmental greatest medially and moderate patellofemoral joint.  Widening lateral joint space.  KL 4 changes bilateral knees.  Some progression since last set of x-rays.

## 2024-07-31 NOTE — PHYSICAL EXAM
[Crutches] : ambulates with crutches [LE] : Sensory: Intact in bilateral lower extremities [DP] : dorsalis pedis 2+ and symmetric bilaterally [PT] : posterior tibial 2+ and symmetric bilaterally [Knee Motion Right] : full ROM [Knee Swelling Left] : swelling [Knee Tenderness On Palpation Left] : tenderness [Knee Motion Left Crepitus] : crepitus with ROM [Normal RLE] : Right Lower Extremity: No scars, rashes, lesions, ulcers, skin intact [Normal LLE] : Left Lower Extremity: No scars, rashes, lesions, ulcers, skin intact [Normal Touch] : sensation intact for touch [Normal] : Oriented to person, place, and time, insight and judgement were intact and the affect was normal [Slightly Antalgic] : slightly antalgic [Knee Swelling Right] : no swelling [Knee Tenderness On Palpation Right] : no tenderness [Knee Motion Right Crepitus] : no crepitus with ROM [Knee Stability / Maneuvers] : negative patellofemoral apprehension test [Knee Anterior Drawer Sign Right] : negative anterior drawer sign [Knee Posterior Drawer Sign Right] : negative posterior drawer sign [Knee Meniscal Integ Julio César's Displace Test Right Medial] : negative Julio César's medially [Knee Meniscal Integ Julio César's Displace Test Right Lateral] : negative Julio César's laterally [Knee Instability Laxity Right Anterior Cruciate Ligament] : negative pivot shift test [Knee Medial Instability Right] : no laxity on valgus stress [Knee Lateral Instability Right] : no laxity on varus stress [Knee Motion Left] : limited ROM [Patellofemoral Apprehension Test Left] : negative patellofemoral apprehension test [Knee Anterior Drawer Sign Left] : negative anterior drawer sign [Knee Posterior Drawer Sign Left] : negative posterior drawer sign [Knee Tender On Palp With Quadriceps Contracted (Shrug Sign)] : negative patellar grind [Knee Instability Laxity Left Anterior Cruciate Ligament] : negative pivot shift test [Knee Medial Instability Left] : no laxity on valgus stress [Knee Lateral Instability Left] : no  laxity on varus stress [de-identified] : Knees Nonantalgic gait today. No effusion.  No warmth, erythema or ecchymoses. Trace LEFT knee effusion. No erythema. Range of motion is 5 -120 on the RIGHT versus 5 to 120 with pain on the LEFT.  Mild pain and crepitus Tender mildly medial joint line LEFT knee. Tender RIGHT knee medial joint line - Marina. Negative anterior and posterior drawer normal varus and valgus laxity. Normal neurovascular exam.  Low back Mild asymmetry in the spine. Mild tenderness in the left low back to sciatic notch but no severe tenderness.  Nontender greater trochanter. Straight leg raises to 90 degrees bilaterally. Normal gait.  She can walk on her heels and toes without difficulty or pain. Deep tendon reflexes 2+ patella and trace at the Achilles. Sensation intact bilateral lower extremities.  5/5 EHL, anterior tibial tendon and gastrocsoleus.  [de-identified] : no respiratory distress or cough [de-identified] :  She had a DEXA scan performed last week at [facility] showing osteopenia with T-scores approaching osteoporosis.  Upon the image of the lumbar spine there appears to be multilevel degenerative disc disease with asymmetric wear of  1/22/24 x-rays of the knees weightbearing 4 views WB 4 views showed severe medial joint space narrowing with osteophytes tricompartmental greatest medially and moderate patellofemoral joint.  Widening lateral joint space.  KL 4 changes bilateral knees.  Some progression since last set of x-rays.

## 2024-07-31 NOTE — HISTORY OF PRESENT ILLNESS
[de-identified] : Ms. Yanez is 75 yo and comes in for with pain today affecting her low back with radiation down her left lower extremity.  She started getting twinges in her back about 4 to 5 weeks ago often just getting out of the pool.  In the last week she has had pain radiating down the left leg and more severe pain and it can wake her up at night.  This past week she has been taking Celebrex 200 mg twice a day.  This morning is better than yesterday when she was having a lot of pain.  No numbness or tingling or weakness.  She is going away next week. She also comes in for follow-up for the knee arthritis. We last did Monovisc left knee 1/22/24 and Synvisc One injection right knee 6/15/23 which helped.  She was thinking of getting injections of the hyaluronic acid again but currently her back is much worse than her knees.  She swims and exercises and her weight is good. She has a 15-month-old grandchild that she helps care for which can put stress on all of these joints.

## 2024-07-31 NOTE — ASSESSMENT
[FreeTextEntry1] : 75 yo with bilateral knee severe osteoarthritis medial compartments and moderate in the patellofemoral compartment.   She comes in today with the bigger issue which she has pain in the back with radiation down the left leg.  There is no neurologic deficits.  This morning it seems to be better than yesterday but it can get worse through the day as she does things. I would get an MRI to workup further to see if there is nerve impingement if pain is ongoing. I can see by images taken for the DEXA scan she does have multilevel degenerative disc disease and spondylosis. I referred her to physical therapy.  She is taking high-dose Celebrex.  If it is not getting better I would recommend trying prednisone gave her 10 mg tablets to taper from 40 mg down to 10 mg decreasing by 10 mg every other day. If she does that she should stop the Celebrex.  She can also take Tylenol for pain.  She will get the MRI if it is not improving. She will be away next week. She should make a follow-up in 2 to 3 weeks so we can check her back and also start a round of hyaluronic acid injections likely for her knee.  She will have about 4 weeks then before her next trip to give it time for the HA to work. She is still is not at the point that she is limited enough in terms of pain and quality of life to consider knee replacement. I will call her with MRI results if I get them before the next appointment

## 2024-07-31 NOTE — ASSESSMENT
[FreeTextEntry1] : 73 yo with bilateral knee severe osteoarthritis medial compartments and moderate in the patellofemoral compartment.   She comes in today with the bigger issue which she has pain in the back with radiation down the left leg.  There is no neurologic deficits.  This morning it seems to be better than yesterday but it can get worse through the day as she does things. I would get an MRI to workup further to see if there is nerve impingement if pain is ongoing. I can see by images taken for the DEXA scan she does have multilevel degenerative disc disease and spondylosis. I referred her to physical therapy.  She is taking high-dose Celebrex.  If it is not getting better I would recommend trying prednisone gave her 10 mg tablets to taper from 40 mg down to 10 mg decreasing by 10 mg every other day. If she does that she should stop the Celebrex.  She can also take Tylenol for pain.  She will get the MRI if it is not improving. She will be away next week. She should make a follow-up in 2 to 3 weeks so we can check her back and also start a round of hyaluronic acid injections likely for her knee.  She will have about 4 weeks then before her next trip to give it time for the HA to work. She is still is not at the point that she is limited enough in terms of pain and quality of life to consider knee replacement. I will call her with MRI results if I get them before the next appointment

## 2024-07-31 NOTE — HISTORY OF PRESENT ILLNESS
[de-identified] : Ms. Yanez is 73 yo and comes in for with pain today affecting her low back with radiation down her left lower extremity.  She started getting twinges in her back about 4 to 5 weeks ago often just getting out of the pool.  In the last week she has had pain radiating down the left leg and more severe pain and it can wake her up at night.  This past week she has been taking Celebrex 200 mg twice a day.  This morning is better than yesterday when she was having a lot of pain.  No numbness or tingling or weakness.  She is going away next week. She also comes in for follow-up for the knee arthritis. We last did Monovisc left knee 1/22/24 and Synvisc One injection right knee 6/15/23 which helped.  She was thinking of getting injections of the hyaluronic acid again but currently her back is much worse than her knees.  She swims and exercises and her weight is good. She has a 15-month-old grandchild that she helps care for which can put stress on all of these joints.

## 2024-08-19 ENCOUNTER — APPOINTMENT (OUTPATIENT)
Dept: ORTHOPEDIC SURGERY | Facility: CLINIC | Age: 74
End: 2024-08-19

## 2024-09-05 ENCOUNTER — APPOINTMENT (OUTPATIENT)
Dept: ORTHOPEDIC SURGERY | Facility: CLINIC | Age: 74
End: 2024-09-05

## 2024-09-08 ENCOUNTER — NON-APPOINTMENT (OUTPATIENT)
Age: 74
End: 2024-09-08

## 2024-09-10 RX ORDER — TRAMADOL HYDROCHLORIDE 50 MG/1
50 TABLET, COATED ORAL
Qty: 20 | Refills: 0 | Status: ACTIVE | COMMUNITY
Start: 2024-09-10 | End: 1900-01-01

## 2024-11-27 ENCOUNTER — APPOINTMENT (OUTPATIENT)
Dept: ORTHOPEDIC SURGERY | Facility: CLINIC | Age: 74
End: 2024-11-27
Payer: MEDICARE

## 2024-11-27 ENCOUNTER — TRANSCRIPTION ENCOUNTER (OUTPATIENT)
Age: 74
End: 2024-11-27

## 2024-11-27 DIAGNOSIS — M17.12 UNILATERAL PRIMARY OSTEOARTHRITIS, LEFT KNEE: ICD-10-CM

## 2024-11-27 DIAGNOSIS — M24.561 CONTRACTURE, RIGHT KNEE: ICD-10-CM

## 2024-11-27 DIAGNOSIS — M17.11 UNILATERAL PRIMARY OSTEOARTHRITIS, RIGHT KNEE: ICD-10-CM

## 2024-11-27 DIAGNOSIS — S99.921A UNSPECIFIED INJURY OF RIGHT FOOT, INITIAL ENCOUNTER: ICD-10-CM

## 2024-11-27 DIAGNOSIS — M25.562 PAIN IN RIGHT KNEE: ICD-10-CM

## 2024-11-27 DIAGNOSIS — M17.0 BILATERAL PRIMARY OSTEOARTHRITIS OF KNEE: ICD-10-CM

## 2024-11-27 DIAGNOSIS — G89.29 PAIN IN RIGHT KNEE: ICD-10-CM

## 2024-11-27 DIAGNOSIS — M25.561 PAIN IN RIGHT KNEE: ICD-10-CM

## 2024-11-27 PROCEDURE — 73630 X-RAY EXAM OF FOOT: CPT | Mod: RT

## 2024-11-27 PROCEDURE — 20610 DRAIN/INJ JOINT/BURSA W/O US: CPT | Mod: 59,LT

## 2024-11-27 PROCEDURE — 99214 OFFICE O/P EST MOD 30 MIN: CPT | Mod: 25

## 2025-02-19 ENCOUNTER — APPOINTMENT (OUTPATIENT)
Dept: ORTHOPEDIC SURGERY | Facility: CLINIC | Age: 75
End: 2025-02-19
Payer: MEDICARE

## 2025-02-19 DIAGNOSIS — G89.29 PAIN IN RIGHT KNEE: ICD-10-CM

## 2025-02-19 DIAGNOSIS — M17.11 UNILATERAL PRIMARY OSTEOARTHRITIS, RIGHT KNEE: ICD-10-CM

## 2025-02-19 DIAGNOSIS — M17.12 UNILATERAL PRIMARY OSTEOARTHRITIS, LEFT KNEE: ICD-10-CM

## 2025-02-19 DIAGNOSIS — M48.062 SPINAL STENOSIS, LUMBAR REGION WITH NEUROGENIC CLAUDICATION: ICD-10-CM

## 2025-02-19 DIAGNOSIS — M25.562 PAIN IN RIGHT KNEE: ICD-10-CM

## 2025-02-19 DIAGNOSIS — M25.561 PAIN IN RIGHT KNEE: ICD-10-CM

## 2025-02-19 PROCEDURE — 20610 DRAIN/INJ JOINT/BURSA W/O US: CPT | Mod: 59,RT

## 2025-02-19 PROCEDURE — 73564 X-RAY EXAM KNEE 4 OR MORE: CPT | Mod: 50

## 2025-02-19 PROCEDURE — 99213 OFFICE O/P EST LOW 20 MIN: CPT | Mod: 25

## 2025-05-09 ENCOUNTER — APPOINTMENT (OUTPATIENT)
Dept: ORTHOPEDIC SURGERY | Facility: CLINIC | Age: 75
End: 2025-05-09
Payer: MEDICARE

## 2025-05-09 DIAGNOSIS — M25.562 PAIN IN RIGHT KNEE: ICD-10-CM

## 2025-05-09 DIAGNOSIS — M25.561 PAIN IN RIGHT KNEE: ICD-10-CM

## 2025-05-09 DIAGNOSIS — M48.062 SPINAL STENOSIS, LUMBAR REGION WITH NEUROGENIC CLAUDICATION: ICD-10-CM

## 2025-05-09 DIAGNOSIS — M17.0 BILATERAL PRIMARY OSTEOARTHRITIS OF KNEE: ICD-10-CM

## 2025-05-09 DIAGNOSIS — M47.27 OTHER SPONDYLOSIS WITH RADICULOPATHY, LUMBOSACRAL REGION: ICD-10-CM

## 2025-05-09 DIAGNOSIS — G89.29 PAIN IN RIGHT KNEE: ICD-10-CM

## 2025-05-09 PROCEDURE — 99214 OFFICE O/P EST MOD 30 MIN: CPT

## 2025-05-19 ENCOUNTER — NON-APPOINTMENT (OUTPATIENT)
Age: 75
End: 2025-05-19

## 2025-05-19 ENCOUNTER — APPOINTMENT (OUTPATIENT)
Dept: ORTHOPEDIC SURGERY | Facility: CLINIC | Age: 75
End: 2025-05-19
Payer: MEDICARE

## 2025-05-19 VITALS
HEIGHT: 62 IN | BODY MASS INDEX: 23.92 KG/M2 | SYSTOLIC BLOOD PRESSURE: 128 MMHG | WEIGHT: 130 LBS | DIASTOLIC BLOOD PRESSURE: 88 MMHG | OXYGEN SATURATION: 98 % | TEMPERATURE: 98.4 F | HEART RATE: 86 BPM

## 2025-05-19 DIAGNOSIS — M41.9 SCOLIOSIS, UNSPECIFIED: ICD-10-CM

## 2025-05-19 PROCEDURE — 72100 X-RAY EXAM L-S SPINE 2/3 VWS: CPT

## 2025-05-19 PROCEDURE — 99214 OFFICE O/P EST MOD 30 MIN: CPT

## 2025-05-22 ENCOUNTER — APPOINTMENT (OUTPATIENT)
Dept: ORTHOPEDIC SURGERY | Facility: CLINIC | Age: 75
End: 2025-05-22
Payer: MEDICARE

## 2025-05-22 DIAGNOSIS — M43.16 SPONDYLOLISTHESIS, LUMBAR REGION: ICD-10-CM

## 2025-05-22 DIAGNOSIS — M48.062 SPINAL STENOSIS, LUMBAR REGION WITH NEUROGENIC CLAUDICATION: ICD-10-CM

## 2025-05-22 DIAGNOSIS — M70.62 TROCHANTERIC BURSITIS, LEFT HIP: ICD-10-CM

## 2025-05-22 PROCEDURE — 99213 OFFICE O/P EST LOW 20 MIN: CPT

## 2025-06-23 ENCOUNTER — APPOINTMENT (OUTPATIENT)
Dept: ORTHOPEDIC SURGERY | Facility: CLINIC | Age: 75
End: 2025-06-23
Payer: MEDICARE

## 2025-06-23 PROCEDURE — 99212 OFFICE O/P EST SF 10 MIN: CPT | Mod: 25

## 2025-06-23 PROCEDURE — 73564 X-RAY EXAM KNEE 4 OR MORE: CPT | Mod: 59,LT

## 2025-07-31 ENCOUNTER — APPOINTMENT (OUTPATIENT)
Dept: ORTHOPEDIC SURGERY | Facility: CLINIC | Age: 75
End: 2025-07-31

## 2025-08-13 ENCOUNTER — APPOINTMENT (OUTPATIENT)
Dept: ORTHOPEDIC SURGERY | Facility: CLINIC | Age: 75
End: 2025-08-13

## 2025-08-13 DIAGNOSIS — M17.0 BILATERAL PRIMARY OSTEOARTHRITIS OF KNEE: ICD-10-CM

## 2025-08-13 DIAGNOSIS — M47.27 OTHER SPONDYLOSIS WITH RADICULOPATHY, LUMBOSACRAL REGION: ICD-10-CM

## 2025-08-13 DIAGNOSIS — M70.62 TROCHANTERIC BURSITIS, LEFT HIP: ICD-10-CM

## 2025-08-13 PROCEDURE — 20610 DRAIN/INJ JOINT/BURSA W/O US: CPT | Mod: LT

## 2025-08-13 PROCEDURE — 99213 OFFICE O/P EST LOW 20 MIN: CPT | Mod: 25
